# Patient Record
Sex: MALE | Race: BLACK OR AFRICAN AMERICAN | NOT HISPANIC OR LATINO | Employment: FULL TIME | RURAL
[De-identification: names, ages, dates, MRNs, and addresses within clinical notes are randomized per-mention and may not be internally consistent; named-entity substitution may affect disease eponyms.]

---

## 2021-06-22 ENCOUNTER — OFFICE VISIT (OUTPATIENT)
Dept: FAMILY MEDICINE | Facility: CLINIC | Age: 56
End: 2021-06-22
Payer: COMMERCIAL

## 2021-06-22 VITALS
HEART RATE: 59 BPM | SYSTOLIC BLOOD PRESSURE: 137 MMHG | BODY MASS INDEX: 39.71 KG/M2 | HEIGHT: 68 IN | WEIGHT: 262 LBS | RESPIRATION RATE: 18 BRPM | TEMPERATURE: 97 F | DIASTOLIC BLOOD PRESSURE: 84 MMHG

## 2021-06-22 DIAGNOSIS — E78.5 HYPERLIPIDEMIA, UNSPECIFIED HYPERLIPIDEMIA TYPE: ICD-10-CM

## 2021-06-22 DIAGNOSIS — I10 HYPERTENSION, UNSPECIFIED TYPE: Primary | ICD-10-CM

## 2021-06-22 DIAGNOSIS — M19.90 ARTHRITIS: ICD-10-CM

## 2021-06-22 DIAGNOSIS — K21.9 GASTROESOPHAGEAL REFLUX DISEASE, UNSPECIFIED WHETHER ESOPHAGITIS PRESENT: ICD-10-CM

## 2021-06-22 LAB
ALBUMIN SERPL BCP-MCNC: 4 G/DL (ref 3.5–5)
ALBUMIN/GLOB SERPL: 1.1 {RATIO}
ALP SERPL-CCNC: 62 U/L (ref 45–115)
ALT SERPL W P-5'-P-CCNC: 31 U/L (ref 16–61)
ANION GAP SERPL CALCULATED.3IONS-SCNC: 10 MMOL/L (ref 7–16)
AST SERPL W P-5'-P-CCNC: 20 U/L (ref 15–37)
BILIRUB SERPL-MCNC: 1.1 MG/DL (ref 0–1.2)
BUN SERPL-MCNC: 12 MG/DL (ref 7–18)
BUN/CREAT SERPL: 15 (ref 6–20)
CALCIUM SERPL-MCNC: 9.4 MG/DL (ref 8.5–10.1)
CHLORIDE SERPL-SCNC: 103 MMOL/L (ref 98–107)
CHOLEST SERPL-MCNC: 151 MG/DL (ref 0–200)
CHOLEST/HDLC SERPL: 2.1 {RATIO}
CO2 SERPL-SCNC: 29 MMOL/L (ref 21–32)
CREAT SERPL-MCNC: 0.79 MG/DL (ref 0.7–1.3)
CREAT UR-MCNC: 117 MG/DL (ref 39–259)
GLOBULIN SER-MCNC: 3.7 G/DL (ref 2–4)
GLUCOSE SERPL-MCNC: 104 MG/DL (ref 74–106)
HDLC SERPL-MCNC: 72 MG/DL (ref 40–60)
LDLC SERPL CALC-MCNC: 68 MG/DL
LDLC/HDLC SERPL: 0.9 {RATIO}
MICROALBUMIN UR-MCNC: 7.3 MG/DL (ref 0–2.8)
MICROALBUMIN/CREAT RATIO PNL UR: 62.4 MG/G (ref 0–30)
NONHDLC SERPL-MCNC: 79 MG/DL
POTASSIUM SERPL-SCNC: 3.4 MMOL/L (ref 3.5–5.1)
PROT SERPL-MCNC: 7.7 G/DL (ref 6.4–8.2)
SODIUM SERPL-SCNC: 139 MMOL/L (ref 136–145)
TRIGL SERPL-MCNC: 57 MG/DL (ref 35–150)
VLDLC SERPL-MCNC: 11 MG/DL

## 2021-06-22 PROCEDURE — 82570 ASSAY OF URINE CREATININE: CPT | Mod: ,,, | Performed by: CLINICAL MEDICAL LABORATORY

## 2021-06-22 PROCEDURE — 99213 OFFICE O/P EST LOW 20 MIN: CPT | Mod: ,,, | Performed by: NURSE PRACTITIONER

## 2021-06-22 PROCEDURE — 80061 LIPID PANEL: CPT | Mod: ,,, | Performed by: CLINICAL MEDICAL LABORATORY

## 2021-06-22 PROCEDURE — 80053 COMPREHENSIVE METABOLIC PANEL: ICD-10-PCS | Mod: ,,, | Performed by: CLINICAL MEDICAL LABORATORY

## 2021-06-22 PROCEDURE — 82043 UR ALBUMIN QUANTITATIVE: CPT | Mod: ,,, | Performed by: CLINICAL MEDICAL LABORATORY

## 2021-06-22 PROCEDURE — 82570 MICROALBUMIN / CREATININE RATIO URINE: ICD-10-PCS | Mod: ,,, | Performed by: CLINICAL MEDICAL LABORATORY

## 2021-06-22 PROCEDURE — 80053 COMPREHEN METABOLIC PANEL: CPT | Mod: ,,, | Performed by: CLINICAL MEDICAL LABORATORY

## 2021-06-22 PROCEDURE — 99213 PR OFFICE/OUTPT VISIT, EST, LEVL III, 20-29 MIN: ICD-10-PCS | Mod: ,,, | Performed by: NURSE PRACTITIONER

## 2021-06-22 PROCEDURE — 82043 MICROALBUMIN / CREATININE RATIO URINE: ICD-10-PCS | Mod: ,,, | Performed by: CLINICAL MEDICAL LABORATORY

## 2021-06-22 PROCEDURE — 80061 LIPID PANEL: ICD-10-PCS | Mod: ,,, | Performed by: CLINICAL MEDICAL LABORATORY

## 2021-06-22 RX ORDER — OLMESARTAN MEDOXOMIL, AMLODIPINE AND HYDROCHLOROTHIAZIDE TABLET 40/10/25 MG 40; 10; 25 MG/1; MG/1; MG/1
1 TABLET ORAL DAILY
COMMUNITY
Start: 2021-02-16 | End: 2021-06-22 | Stop reason: SDUPTHER

## 2021-06-22 RX ORDER — OMEPRAZOLE 20 MG/1
20 CAPSULE, DELAYED RELEASE ORAL DAILY
Qty: 90 CAPSULE | Refills: 0 | Status: SHIPPED | OUTPATIENT
Start: 2021-06-22 | End: 2021-10-13 | Stop reason: SDUPTHER

## 2021-06-22 RX ORDER — MELOXICAM 15 MG/1
15 TABLET ORAL DAILY PRN
Qty: 90 TABLET | Refills: 0 | Status: SHIPPED | OUTPATIENT
Start: 2021-06-22 | End: 2021-10-13 | Stop reason: SDUPTHER

## 2021-06-22 RX ORDER — OLMESARTAN MEDOXOMIL, AMLODIPINE AND HYDROCHLOROTHIAZIDE TABLET 40/10/25 MG 40; 10; 25 MG/1; MG/1; MG/1
1 TABLET ORAL DAILY
Qty: 90 TABLET | Refills: 0 | Status: SHIPPED | OUTPATIENT
Start: 2021-06-22 | End: 2021-10-13 | Stop reason: SDUPTHER

## 2021-06-22 RX ORDER — OMEPRAZOLE 20 MG/1
20 CAPSULE, DELAYED RELEASE ORAL DAILY
COMMUNITY
Start: 2021-03-22 | End: 2021-06-22 | Stop reason: SDUPTHER

## 2021-06-22 RX ORDER — MELOXICAM 15 MG/1
15 TABLET ORAL DAILY PRN
COMMUNITY
Start: 2021-03-10 | End: 2021-06-22 | Stop reason: SDUPTHER

## 2021-06-23 RX ORDER — POTASSIUM CHLORIDE 20 MEQ/1
20 TABLET, EXTENDED RELEASE ORAL DAILY
Qty: 90 TABLET | Refills: 0 | Status: SHIPPED | OUTPATIENT
Start: 2021-06-23 | End: 2021-09-14

## 2021-10-13 ENCOUNTER — OFFICE VISIT (OUTPATIENT)
Dept: FAMILY MEDICINE | Facility: CLINIC | Age: 56
End: 2021-10-13
Payer: COMMERCIAL

## 2021-10-13 VITALS
WEIGHT: 260 LBS | TEMPERATURE: 98 F | SYSTOLIC BLOOD PRESSURE: 153 MMHG | BODY MASS INDEX: 39.4 KG/M2 | HEART RATE: 68 BPM | RESPIRATION RATE: 18 BRPM | DIASTOLIC BLOOD PRESSURE: 85 MMHG | HEIGHT: 68 IN

## 2021-10-13 DIAGNOSIS — E78.5 HYPERLIPIDEMIA, UNSPECIFIED HYPERLIPIDEMIA TYPE: ICD-10-CM

## 2021-10-13 DIAGNOSIS — K21.9 GASTROESOPHAGEAL REFLUX DISEASE, UNSPECIFIED WHETHER ESOPHAGITIS PRESENT: ICD-10-CM

## 2021-10-13 DIAGNOSIS — I10 HYPERTENSION, UNSPECIFIED TYPE: Primary | ICD-10-CM

## 2021-10-13 DIAGNOSIS — M19.90 ARTHRITIS: ICD-10-CM

## 2021-10-13 LAB
ALBUMIN SERPL BCP-MCNC: 4 G/DL (ref 3.5–5)
ALBUMIN/GLOB SERPL: 1.1 {RATIO}
ALP SERPL-CCNC: 62 U/L (ref 45–115)
ALT SERPL W P-5'-P-CCNC: 33 U/L (ref 16–61)
ANION GAP SERPL CALCULATED.3IONS-SCNC: 9 MMOL/L (ref 7–16)
AST SERPL W P-5'-P-CCNC: 19 U/L (ref 15–37)
BILIRUB SERPL-MCNC: 0.8 MG/DL (ref 0–1.2)
BUN SERPL-MCNC: 12 MG/DL (ref 7–18)
BUN/CREAT SERPL: 14 (ref 6–20)
CALCIUM SERPL-MCNC: 9.3 MG/DL (ref 8.5–10.1)
CHLORIDE SERPL-SCNC: 106 MMOL/L (ref 98–107)
CHOLEST SERPL-MCNC: 156 MG/DL (ref 0–200)
CHOLEST/HDLC SERPL: 2.3 {RATIO}
CO2 SERPL-SCNC: 27 MMOL/L (ref 21–32)
CREAT SERPL-MCNC: 0.86 MG/DL (ref 0.7–1.3)
CREAT UR-MCNC: 37 MG/DL (ref 39–259)
GLOBULIN SER-MCNC: 3.8 G/DL (ref 2–4)
GLUCOSE SERPL-MCNC: 115 MG/DL (ref 74–106)
HDLC SERPL-MCNC: 69 MG/DL (ref 40–60)
LDLC SERPL CALC-MCNC: 78 MG/DL
LDLC/HDLC SERPL: 1.1 {RATIO}
MICROALBUMIN UR-MCNC: 10.3 MG/DL (ref 0–2.8)
MICROALBUMIN/CREAT RATIO PNL UR: 278.4 MG/G (ref 0–30)
NONHDLC SERPL-MCNC: 87 MG/DL
POTASSIUM SERPL-SCNC: 3.6 MMOL/L (ref 3.5–5.1)
PROT SERPL-MCNC: 7.8 G/DL (ref 6.4–8.2)
SODIUM SERPL-SCNC: 138 MMOL/L (ref 136–145)
TRIGL SERPL-MCNC: 45 MG/DL (ref 35–150)
VLDLC SERPL-MCNC: 9 MG/DL

## 2021-10-13 PROCEDURE — 82570 ASSAY OF URINE CREATININE: CPT | Mod: ,,, | Performed by: CLINICAL MEDICAL LABORATORY

## 2021-10-13 PROCEDURE — 80061 LIPID PANEL: CPT | Mod: ,,, | Performed by: CLINICAL MEDICAL LABORATORY

## 2021-10-13 PROCEDURE — 80053 COMPREHENSIVE METABOLIC PANEL: ICD-10-PCS | Mod: ,,, | Performed by: CLINICAL MEDICAL LABORATORY

## 2021-10-13 PROCEDURE — 82043 UR ALBUMIN QUANTITATIVE: CPT | Mod: ,,, | Performed by: CLINICAL MEDICAL LABORATORY

## 2021-10-13 PROCEDURE — 82043 MICROALBUMIN / CREATININE RATIO URINE: ICD-10-PCS | Mod: ,,, | Performed by: CLINICAL MEDICAL LABORATORY

## 2021-10-13 PROCEDURE — 80061 LIPID PANEL: ICD-10-PCS | Mod: ,,, | Performed by: CLINICAL MEDICAL LABORATORY

## 2021-10-13 PROCEDURE — 82570 MICROALBUMIN / CREATININE RATIO URINE: ICD-10-PCS | Mod: ,,, | Performed by: CLINICAL MEDICAL LABORATORY

## 2021-10-13 PROCEDURE — 99213 OFFICE O/P EST LOW 20 MIN: CPT | Mod: ,,, | Performed by: NURSE PRACTITIONER

## 2021-10-13 PROCEDURE — 80053 COMPREHEN METABOLIC PANEL: CPT | Mod: ,,, | Performed by: CLINICAL MEDICAL LABORATORY

## 2021-10-13 PROCEDURE — 99213 PR OFFICE/OUTPT VISIT, EST, LEVL III, 20-29 MIN: ICD-10-PCS | Mod: ,,, | Performed by: NURSE PRACTITIONER

## 2021-10-13 RX ORDER — MELOXICAM 15 MG/1
15 TABLET ORAL DAILY PRN
Qty: 90 TABLET | Refills: 1 | Status: SHIPPED | OUTPATIENT
Start: 2021-10-13 | End: 2022-05-17

## 2021-10-13 RX ORDER — POTASSIUM CHLORIDE 20 MEQ/1
20 TABLET, EXTENDED RELEASE ORAL DAILY
Qty: 90 TABLET | Refills: 1 | Status: SHIPPED | OUTPATIENT
Start: 2021-10-13 | End: 2022-06-29 | Stop reason: SDUPTHER

## 2021-10-13 RX ORDER — OLMESARTAN MEDOXOMIL, AMLODIPINE AND HYDROCHLOROTHIAZIDE TABLET 40/10/25 MG 40; 10; 25 MG/1; MG/1; MG/1
1 TABLET ORAL DAILY
Qty: 90 TABLET | Refills: 1 | Status: SHIPPED | OUTPATIENT
Start: 2021-10-13 | End: 2022-04-18

## 2021-10-13 RX ORDER — OMEPRAZOLE 20 MG/1
20 CAPSULE, DELAYED RELEASE ORAL DAILY
Qty: 90 CAPSULE | Refills: 1 | Status: SHIPPED | OUTPATIENT
Start: 2021-10-13 | End: 2022-04-18

## 2022-02-03 ENCOUNTER — PATIENT OUTREACH (OUTPATIENT)
Dept: ADMINISTRATIVE | Facility: HOSPITAL | Age: 57
End: 2022-02-03

## 2022-06-29 ENCOUNTER — OFFICE VISIT (OUTPATIENT)
Dept: FAMILY MEDICINE | Facility: CLINIC | Age: 57
End: 2022-06-29
Payer: COMMERCIAL

## 2022-06-29 VITALS
SYSTOLIC BLOOD PRESSURE: 132 MMHG | WEIGHT: 270 LBS | TEMPERATURE: 98 F | DIASTOLIC BLOOD PRESSURE: 88 MMHG | HEIGHT: 68 IN | HEART RATE: 59 BPM | RESPIRATION RATE: 18 BRPM | BODY MASS INDEX: 40.92 KG/M2

## 2022-06-29 DIAGNOSIS — E55.9 VITAMIN D DEFICIENCY: ICD-10-CM

## 2022-06-29 DIAGNOSIS — E78.5 HYPERLIPIDEMIA, UNSPECIFIED HYPERLIPIDEMIA TYPE: ICD-10-CM

## 2022-06-29 DIAGNOSIS — M19.90 ARTHRITIS: ICD-10-CM

## 2022-06-29 DIAGNOSIS — R73.01 IMPAIRED FASTING GLUCOSE: ICD-10-CM

## 2022-06-29 DIAGNOSIS — R06.83 SNORING: ICD-10-CM

## 2022-06-29 DIAGNOSIS — R53.83 FATIGUE, UNSPECIFIED TYPE: ICD-10-CM

## 2022-06-29 DIAGNOSIS — Z12.5 SCREENING FOR PROSTATE CANCER: ICD-10-CM

## 2022-06-29 DIAGNOSIS — K21.9 GASTROESOPHAGEAL REFLUX DISEASE, UNSPECIFIED WHETHER ESOPHAGITIS PRESENT: ICD-10-CM

## 2022-06-29 DIAGNOSIS — I10 HYPERTENSION, UNSPECIFIED TYPE: Primary | ICD-10-CM

## 2022-06-29 DIAGNOSIS — E53.8 VITAMIN B12 DEFICIENCY: ICD-10-CM

## 2022-06-29 LAB
25(OH)D3 SERPL-MCNC: 20.3 NG/ML
ALBUMIN SERPL BCP-MCNC: 3.8 G/DL (ref 3.5–5)
ALBUMIN/GLOB SERPL: 1.1 {RATIO}
ALP SERPL-CCNC: 64 U/L (ref 45–115)
ALT SERPL W P-5'-P-CCNC: 33 U/L (ref 16–61)
ANION GAP SERPL CALCULATED.3IONS-SCNC: 10 MMOL/L (ref 7–16)
AST SERPL W P-5'-P-CCNC: 17 U/L (ref 15–37)
BASOPHILS # BLD AUTO: 0.05 K/UL (ref 0–0.2)
BASOPHILS NFR BLD AUTO: 0.8 % (ref 0–1)
BILIRUB SERPL-MCNC: 1 MG/DL (ref 0–1.2)
BUN SERPL-MCNC: 16 MG/DL (ref 7–18)
BUN/CREAT SERPL: 18 (ref 6–20)
CALCIUM SERPL-MCNC: 9.4 MG/DL (ref 8.5–10.1)
CHLORIDE SERPL-SCNC: 104 MMOL/L (ref 98–107)
CHOLEST SERPL-MCNC: 163 MG/DL (ref 0–200)
CHOLEST/HDLC SERPL: 2.5 {RATIO}
CO2 SERPL-SCNC: 29 MMOL/L (ref 21–32)
CREAT SERPL-MCNC: 0.88 MG/DL (ref 0.7–1.3)
CREAT UR-MCNC: 40 MG/DL (ref 39–259)
DIFFERENTIAL METHOD BLD: ABNORMAL
EOSINOPHIL # BLD AUTO: 0.2 K/UL (ref 0–0.5)
EOSINOPHIL NFR BLD AUTO: 3.3 % (ref 1–4)
ERYTHROCYTE [DISTWIDTH] IN BLOOD BY AUTOMATED COUNT: 14.3 % (ref 11.5–14.5)
EST. AVERAGE GLUCOSE BLD GHB EST-MCNC: 84 MG/DL
FOLATE SERPL-MCNC: 13.1 NG/ML (ref 3.1–17.5)
GLOBULIN SER-MCNC: 3.4 G/DL (ref 2–4)
GLUCOSE SERPL-MCNC: 113 MG/DL (ref 74–106)
HBA1C MFR BLD HPLC: 5.1 % (ref 4.5–6.6)
HCT VFR BLD AUTO: 38.6 % (ref 40–54)
HDLC SERPL-MCNC: 65 MG/DL (ref 40–60)
HGB BLD-MCNC: 12.5 G/DL (ref 13.5–18)
IMM GRANULOCYTES # BLD AUTO: 0.01 K/UL (ref 0–0.04)
IMM GRANULOCYTES NFR BLD: 0.2 % (ref 0–0.4)
LDLC SERPL CALC-MCNC: 85 MG/DL
LDLC/HDLC SERPL: 1.3 {RATIO}
LYMPHOCYTES # BLD AUTO: 1.81 K/UL (ref 1–4.8)
LYMPHOCYTES NFR BLD AUTO: 29.8 % (ref 27–41)
MCH RBC QN AUTO: 28.5 PG (ref 27–31)
MCHC RBC AUTO-ENTMCNC: 32.4 G/DL (ref 32–36)
MCV RBC AUTO: 87.9 FL (ref 80–96)
MICROALBUMIN UR-MCNC: 6 MG/DL (ref 0–2.8)
MICROALBUMIN/CREAT RATIO PNL UR: 150 MG/G (ref 0–30)
MONOCYTES # BLD AUTO: 0.37 K/UL (ref 0–0.8)
MONOCYTES NFR BLD AUTO: 6.1 % (ref 2–6)
MPC BLD CALC-MCNC: 11.6 FL (ref 9.4–12.4)
NEUTROPHILS # BLD AUTO: 3.64 K/UL (ref 1.8–7.7)
NEUTROPHILS NFR BLD AUTO: 59.8 % (ref 53–65)
NONHDLC SERPL-MCNC: 98 MG/DL
NRBC # BLD AUTO: 0 X10E3/UL
NRBC, AUTO (.00): 0 %
PLATELET # BLD AUTO: 243 K/UL (ref 150–400)
POTASSIUM SERPL-SCNC: 3.8 MMOL/L (ref 3.5–5.1)
PROT SERPL-MCNC: 7.2 G/DL (ref 6.4–8.2)
PSA SERPL-MCNC: 0.39 NG/ML (ref 0–3.1)
RBC # BLD AUTO: 4.39 M/UL (ref 4.6–6.2)
SODIUM SERPL-SCNC: 139 MMOL/L (ref 136–145)
TRIGL SERPL-MCNC: 66 MG/DL (ref 35–150)
TSH SERPL DL<=0.005 MIU/L-ACNC: 1.52 UIU/ML (ref 0.36–3.74)
VIT B12 SERPL-MCNC: 442 PG/ML (ref 193–986)
VLDLC SERPL-MCNC: 13 MG/DL
WBC # BLD AUTO: 6.08 K/UL (ref 4.5–11)

## 2022-06-29 PROCEDURE — 1159F MED LIST DOCD IN RCRD: CPT | Mod: CPTII,,, | Performed by: NURSE PRACTITIONER

## 2022-06-29 PROCEDURE — 99214 OFFICE O/P EST MOD 30 MIN: CPT | Mod: ,,, | Performed by: NURSE PRACTITIONER

## 2022-06-29 PROCEDURE — 84443 TSH: ICD-10-PCS | Mod: ,,, | Performed by: CLINICAL MEDICAL LABORATORY

## 2022-06-29 PROCEDURE — 80053 COMPREHEN METABOLIC PANEL: CPT | Mod: ,,, | Performed by: CLINICAL MEDICAL LABORATORY

## 2022-06-29 PROCEDURE — 3075F PR MOST RECENT SYSTOLIC BLOOD PRESS GE 130-139MM HG: ICD-10-PCS | Mod: CPTII,,, | Performed by: NURSE PRACTITIONER

## 2022-06-29 PROCEDURE — 1159F PR MEDICATION LIST DOCUMENTED IN MEDICAL RECORD: ICD-10-PCS | Mod: CPTII,,, | Performed by: NURSE PRACTITIONER

## 2022-06-29 PROCEDURE — 3079F PR MOST RECENT DIASTOLIC BLOOD PRESSURE 80-89 MM HG: ICD-10-PCS | Mod: CPTII,,, | Performed by: NURSE PRACTITIONER

## 2022-06-29 PROCEDURE — 82570 ASSAY OF URINE CREATININE: CPT | Mod: ,,, | Performed by: CLINICAL MEDICAL LABORATORY

## 2022-06-29 PROCEDURE — G0103 PSA SCREENING: HCPCS | Mod: ,,, | Performed by: CLINICAL MEDICAL LABORATORY

## 2022-06-29 PROCEDURE — 1160F RVW MEDS BY RX/DR IN RCRD: CPT | Mod: CPTII,,, | Performed by: NURSE PRACTITIONER

## 2022-06-29 PROCEDURE — 85025 COMPLETE CBC W/AUTO DIFF WBC: CPT | Mod: ,,, | Performed by: CLINICAL MEDICAL LABORATORY

## 2022-06-29 PROCEDURE — 82607 VITAMIN B12/FOLATE, SERUM PANEL: ICD-10-PCS | Mod: ,,, | Performed by: CLINICAL MEDICAL LABORATORY

## 2022-06-29 PROCEDURE — 82043 UR ALBUMIN QUANTITATIVE: CPT | Mod: ,,, | Performed by: CLINICAL MEDICAL LABORATORY

## 2022-06-29 PROCEDURE — 3075F SYST BP GE 130 - 139MM HG: CPT | Mod: CPTII,,, | Performed by: NURSE PRACTITIONER

## 2022-06-29 PROCEDURE — 80061 LIPID PANEL: CPT | Mod: ,,, | Performed by: CLINICAL MEDICAL LABORATORY

## 2022-06-29 PROCEDURE — 82746 VITAMIN B12/FOLATE, SERUM PANEL: ICD-10-PCS | Mod: ,,, | Performed by: CLINICAL MEDICAL LABORATORY

## 2022-06-29 PROCEDURE — 82306 VITAMIN D 25 HYDROXY: CPT | Mod: ,,, | Performed by: CLINICAL MEDICAL LABORATORY

## 2022-06-29 PROCEDURE — 83036 HEMOGLOBIN A1C: ICD-10-PCS | Mod: ,,, | Performed by: CLINICAL MEDICAL LABORATORY

## 2022-06-29 PROCEDURE — 3008F PR BODY MASS INDEX (BMI) DOCUMENTED: ICD-10-PCS | Mod: CPTII,,, | Performed by: NURSE PRACTITIONER

## 2022-06-29 PROCEDURE — 80053 COMPREHENSIVE METABOLIC PANEL: ICD-10-PCS | Mod: ,,, | Performed by: CLINICAL MEDICAL LABORATORY

## 2022-06-29 PROCEDURE — 99214 PR OFFICE/OUTPT VISIT, EST, LEVL IV, 30-39 MIN: ICD-10-PCS | Mod: ,,, | Performed by: NURSE PRACTITIONER

## 2022-06-29 PROCEDURE — 82746 ASSAY OF FOLIC ACID SERUM: CPT | Mod: ,,, | Performed by: CLINICAL MEDICAL LABORATORY

## 2022-06-29 PROCEDURE — 84443 ASSAY THYROID STIM HORMONE: CPT | Mod: ,,, | Performed by: CLINICAL MEDICAL LABORATORY

## 2022-06-29 PROCEDURE — 82607 VITAMIN B-12: CPT | Mod: ,,, | Performed by: CLINICAL MEDICAL LABORATORY

## 2022-06-29 PROCEDURE — 3079F DIAST BP 80-89 MM HG: CPT | Mod: CPTII,,, | Performed by: NURSE PRACTITIONER

## 2022-06-29 PROCEDURE — 82043 MICROALBUMIN / CREATININE RATIO URINE: ICD-10-PCS | Mod: ,,, | Performed by: CLINICAL MEDICAL LABORATORY

## 2022-06-29 PROCEDURE — 82306 VITAMIN D: ICD-10-PCS | Mod: ,,, | Performed by: CLINICAL MEDICAL LABORATORY

## 2022-06-29 PROCEDURE — 80061 LIPID PANEL: ICD-10-PCS | Mod: ,,, | Performed by: CLINICAL MEDICAL LABORATORY

## 2022-06-29 PROCEDURE — 1160F PR REVIEW ALL MEDS BY PRESCRIBER/CLIN PHARMACIST DOCUMENTED: ICD-10-PCS | Mod: CPTII,,, | Performed by: NURSE PRACTITIONER

## 2022-06-29 PROCEDURE — 3008F BODY MASS INDEX DOCD: CPT | Mod: CPTII,,, | Performed by: NURSE PRACTITIONER

## 2022-06-29 PROCEDURE — G0103 PSA, SCREENING: ICD-10-PCS | Mod: ,,, | Performed by: CLINICAL MEDICAL LABORATORY

## 2022-06-29 PROCEDURE — 83036 HEMOGLOBIN GLYCOSYLATED A1C: CPT | Mod: ,,, | Performed by: CLINICAL MEDICAL LABORATORY

## 2022-06-29 PROCEDURE — 82570 MICROALBUMIN / CREATININE RATIO URINE: ICD-10-PCS | Mod: ,,, | Performed by: CLINICAL MEDICAL LABORATORY

## 2022-06-29 PROCEDURE — 85025 CBC WITH DIFFERENTIAL: ICD-10-PCS | Mod: ,,, | Performed by: CLINICAL MEDICAL LABORATORY

## 2022-06-29 RX ORDER — POTASSIUM CHLORIDE 20 MEQ/1
20 TABLET, EXTENDED RELEASE ORAL DAILY
Qty: 90 TABLET | Refills: 1 | Status: SHIPPED | OUTPATIENT
Start: 2022-06-29 | End: 2022-12-19 | Stop reason: SDUPTHER

## 2022-06-29 RX ORDER — MELOXICAM 15 MG/1
15 TABLET ORAL DAILY PRN
Qty: 90 TABLET | Refills: 1 | Status: SHIPPED | OUTPATIENT
Start: 2022-06-29 | End: 2022-12-19 | Stop reason: SDUPTHER

## 2022-06-29 RX ORDER — OLMESARTAN MEDOXOMIL, AMLODIPINE AND HYDROCHLOROTHIAZIDE TABLET 40/10/25 MG 40; 10; 25 MG/1; MG/1; MG/1
1 TABLET ORAL DAILY
Qty: 90 TABLET | Refills: 1 | Status: SHIPPED | OUTPATIENT
Start: 2022-06-29 | End: 2022-12-19 | Stop reason: SDUPTHER

## 2022-06-29 RX ORDER — OMEPRAZOLE 20 MG/1
20 CAPSULE, DELAYED RELEASE ORAL DAILY
Qty: 90 CAPSULE | Refills: 1 | Status: SHIPPED | OUTPATIENT
Start: 2022-06-29 | End: 2022-12-19 | Stop reason: SDUPTHER

## 2022-06-29 NOTE — PROGRESS NOTES
AMAURY Carroll   [address] [place] [address]     PATIENT NAME: Kingsley Bridges  : 1965  DATE: 22  MRN: [id number]      Billing Provider: AMAURY Carroll  Level of Service: [place] OFFICE/OUTPT VISIT, EST, LEVL IV, 30-39 MIN  Patient PCP Information     Provider PCP Type    AMAURY Carroll General          Reason for Visit / Chief Complaint: Hypertension       Update PCP  Update Chief Complaint         History of Present Illness / Problem Focused Workflow     Kingsley Bridges presents to the clinic with Hypertension     HPI    Review of Systems     Review of Systems   Constitutional: Negative for chills, diaphoresis, fatigue and fever.   HENT: Negative for nasal congestion, dental problem, ear pain and postnasal drip.    Eyes: Negative for visual disturbance.   Respiratory: Negative for shortness of breath and wheezing.    Cardiovascular: Negative for chest pain and palpitations.   Gastrointestinal: Negative for abdominal distention and abdominal pain.   Endocrine: Negative for cold intolerance and heat intolerance.   Genitourinary: Negative for enuresis.   Musculoskeletal: Negative for neck stiffness.   Integumentary:  Negative for pallor and rash.   Neurological: Negative for dizziness, seizures, speech difficulty and weakness.   Psychiatric/Behavioral: Negative for agitation.        Medical / Social / Family History     Past Medical History:   Diagnosis Date    Acid reflux     Hypertension        Past Surgical History:   Procedure Laterality Date    KNEE SURGERY Left     KNEE SURGERY Right        Social History    reports that he has never smoked. He has never used smokeless tobacco. He reports current alcohol use. He reports that he does not use drugs.    Family History  's family history includes Diabetes in his father; Heart disease in his brother; No Known Problems in his mother.    No flowsheet data found.        Medications and Allergies  "    Medications  Outpatient Medications Marked as Taking for the 6/29/22 encounter (Office Visit) with AMAURY Carroll   Medication Sig Dispense Refill    [DISCONTINUED] meloxicam (MOBIC) 15 MG tablet TAKE 1 TABLET BY MOUTH EVERY DAY AS NEEDED FOR PAIN 30 tablet 5    [DISCONTINUED] olmesartan-amLODIPin-hcthiazid 40-10-25 mg Tab TAKE 1 TABLET BY MOUTH EVERY DAY 90 tablet 1    [DISCONTINUED] omeprazole (PRILOSEC) 20 MG capsule TAKE 1 CAPSULE BY MOUTH EVERY DAY 90 capsule 1    [DISCONTINUED] potassium chloride SA (KLOR-CON M20) 20 MEQ tablet Take 1 tablet (20 mEq total) by mouth once daily. 90 tablet 1       Allergies  Review of patient's allergies indicates:  No Known Allergies    Physical Examination   /88 (BP Location: Left arm, Patient Position: Sitting, BP Method: Large (Automatic))   Pulse (!) 59   Temp 98.3 °F (36.8 °C) (Oral)   Resp 18   Ht 5' 8" (1.727 m)   Wt 122.5 kg (270 lb)   BMI 41.05 kg/m²   Physical Exam  Vitals and nursing note reviewed.   Constitutional:       Appearance: Normal appearance.   HENT:      Head: Normocephalic and atraumatic.      Right Ear: External ear normal.      Left Ear: External ear normal.      Nose: Nose normal.      Mouth/Throat:      Mouth: Mucous membranes are moist.      Pharynx: Oropharynx is clear.   Eyes:      Pupils: Pupils are equal, round, and reactive to light.   Cardiovascular:      Rate and Rhythm: Normal rate and regular rhythm.      Pulses: Normal pulses.      Heart sounds: Normal heart sounds. No murmur heard.    No gallop.   Pulmonary:      Effort: Pulmonary effort is normal.      Breath sounds: Normal breath sounds. No wheezing or rales.   Abdominal:      General: Abdomen is flat. Bowel sounds are normal. There is no distension.      Palpations: Abdomen is soft.      Tenderness: There is no abdominal tenderness.   Musculoskeletal:         General: Normal range of motion.      Cervical back: Normal range of motion and neck supple.   Skin:     " General: Skin is warm and dry.      Capillary Refill: Capillary refill takes less than 2 seconds.   Neurological:      General: No focal deficit present.      Mental Status: He is alert and oriented to person, place, and time.   Psychiatric:         Mood and Affect: Mood normal.         Behavior: Behavior normal.          Assessment and Plan (including Health Maintenance)      Problem List  Smart Sets  Document Outside HM   :    Plan:         Health Maintenance Due   Topic Date Due    Hepatitis C Screening  Never done    COVID-19 Vaccine (1) Never done    HIV Screening  Never done    TETANUS VACCINE  Never done    Shingles Vaccine (1 of 2) Never done       Problem List Items Addressed This Visit        Cardiac/Vascular    Hypertension - Primary    Relevant Medications    potassium chloride SA (KLOR-CON M20) 20 MEQ tablet    olmesartan-amLODIPin-hcthiazid 40-10-25 mg Tab    Other Relevant Orders    Comprehensive Metabolic Panel    Hyperlipidemia    Relevant Orders    Lipid Panel       Renal/    Screening for prostate cancer    Relevant Orders    PSA, Screening       Endocrine    Vitamin D deficiency    Relevant Orders    Vitamin D    Vitamin B12 deficiency    Relevant Orders    Vitamin B12 & Folate    Impaired fasting glucose    Relevant Orders    Hemoglobin A1C    Microalbumin/Creatinine Ratio, Urine    BMI 40.0-44.9, adult       GI    Gastroesophageal reflux disease    Relevant Medications    omeprazole (PRILOSEC) 20 MG capsule       Orthopedic    Arthritis    Relevant Medications    meloxicam (MOBIC) 15 MG tablet       Other    Fatigue    Relevant Orders    CBC Auto Differential    TSH      Other Visit Diagnoses     Snoring        Relevant Orders    Ambulatory referral/consult to Sleep Disorders          Health Maintenance Topics with due status: Not Due       Topic Last Completion Date    Colorectal Cancer Screening 11/05/2015    Lipid Panel 10/13/2021    Influenza Vaccine Not Due       No future  appointments.     Follow up in about 3 months (around 9/29/2022), or if symptoms worsen or fail to improve.        Signature:  AMAURY Carroll      [address] [place] [address]    Date of encounter: 6/29/22

## 2022-12-20 ENCOUNTER — OFFICE VISIT (OUTPATIENT)
Dept: FAMILY MEDICINE | Facility: CLINIC | Age: 57
End: 2022-12-20
Payer: COMMERCIAL

## 2022-12-20 VITALS
HEART RATE: 83 BPM | HEIGHT: 68 IN | RESPIRATION RATE: 18 BRPM | DIASTOLIC BLOOD PRESSURE: 84 MMHG | TEMPERATURE: 98 F | WEIGHT: 282 LBS | BODY MASS INDEX: 42.74 KG/M2 | SYSTOLIC BLOOD PRESSURE: 132 MMHG | OXYGEN SATURATION: 95 %

## 2022-12-20 DIAGNOSIS — M25.532 LEFT WRIST PAIN: ICD-10-CM

## 2022-12-20 DIAGNOSIS — M19.90 ARTHRITIS: ICD-10-CM

## 2022-12-20 DIAGNOSIS — Z11.59 SCREENING FOR VIRAL DISEASE: ICD-10-CM

## 2022-12-20 DIAGNOSIS — E78.5 HYPERLIPIDEMIA, UNSPECIFIED HYPERLIPIDEMIA TYPE: ICD-10-CM

## 2022-12-20 DIAGNOSIS — I10 HYPERTENSION, UNSPECIFIED TYPE: Primary | ICD-10-CM

## 2022-12-20 DIAGNOSIS — K21.9 GASTROESOPHAGEAL REFLUX DISEASE, UNSPECIFIED WHETHER ESOPHAGITIS PRESENT: ICD-10-CM

## 2022-12-20 LAB
ALBUMIN SERPL BCP-MCNC: 4 G/DL (ref 3.5–5)
ALBUMIN/GLOB SERPL: 1.3 {RATIO}
ALP SERPL-CCNC: 71 U/L (ref 45–115)
ALT SERPL W P-5'-P-CCNC: 39 U/L (ref 16–61)
ANION GAP SERPL CALCULATED.3IONS-SCNC: 10 MMOL/L (ref 7–16)
AST SERPL W P-5'-P-CCNC: 24 U/L (ref 15–37)
BASOPHILS # BLD AUTO: 0.05 K/UL (ref 0–0.2)
BASOPHILS NFR BLD AUTO: 0.9 % (ref 0–1)
BILIRUB SERPL-MCNC: 0.7 MG/DL (ref ?–1.2)
BUN SERPL-MCNC: 12 MG/DL (ref 7–18)
BUN/CREAT SERPL: 14 (ref 6–20)
CALCIUM SERPL-MCNC: 8.9 MG/DL (ref 8.5–10.1)
CHLORIDE SERPL-SCNC: 104 MMOL/L (ref 98–107)
CHOLEST SERPL-MCNC: 177 MG/DL (ref 0–200)
CHOLEST/HDLC SERPL: 2.8 {RATIO}
CO2 SERPL-SCNC: 30 MMOL/L (ref 21–32)
CREAT SERPL-MCNC: 0.86 MG/DL (ref 0.7–1.3)
DIFFERENTIAL METHOD BLD: ABNORMAL
EGFR (NO RACE VARIABLE) (RUSH/TITUS): 100 ML/MIN/1.73M²
EOSINOPHIL # BLD AUTO: 0.15 K/UL (ref 0–0.5)
EOSINOPHIL NFR BLD AUTO: 2.6 % (ref 1–4)
ERYTHROCYTE [DISTWIDTH] IN BLOOD BY AUTOMATED COUNT: 14.6 % (ref 11.5–14.5)
GLOBULIN SER-MCNC: 3.2 G/DL (ref 2–4)
GLUCOSE SERPL-MCNC: 128 MG/DL (ref 74–106)
HCT VFR BLD AUTO: 40.5 % (ref 40–54)
HCV AB SER QL: NORMAL
HDLC SERPL-MCNC: 64 MG/DL (ref 40–60)
HGB BLD-MCNC: 12.9 G/DL (ref 13.5–18)
HIV 1+O+2 AB SERPL QL: NORMAL
IMM GRANULOCYTES # BLD AUTO: 0.01 K/UL (ref 0–0.04)
IMM GRANULOCYTES NFR BLD: 0.2 % (ref 0–0.4)
LDLC SERPL CALC-MCNC: 100 MG/DL
LDLC/HDLC SERPL: 1.6 {RATIO}
LYMPHOCYTES # BLD AUTO: 1.71 K/UL (ref 1–4.8)
LYMPHOCYTES NFR BLD AUTO: 30.2 % (ref 27–41)
MCH RBC QN AUTO: 27.9 PG (ref 27–31)
MCHC RBC AUTO-ENTMCNC: 31.9 G/DL (ref 32–36)
MCV RBC AUTO: 87.7 FL (ref 80–96)
MONOCYTES # BLD AUTO: 0.43 K/UL (ref 0–0.8)
MONOCYTES NFR BLD AUTO: 7.6 % (ref 2–6)
MPC BLD CALC-MCNC: 11.5 FL (ref 9.4–12.4)
NEUTROPHILS # BLD AUTO: 3.32 K/UL (ref 1.8–7.7)
NEUTROPHILS NFR BLD AUTO: 58.5 % (ref 53–65)
NONHDLC SERPL-MCNC: 113 MG/DL
NRBC # BLD AUTO: 0 X10E3/UL
NRBC, AUTO (.00): 0 %
PLATELET # BLD AUTO: 221 K/UL (ref 150–400)
POTASSIUM SERPL-SCNC: 3.8 MMOL/L (ref 3.5–5.1)
PROT SERPL-MCNC: 7.2 G/DL (ref 6.4–8.2)
RBC # BLD AUTO: 4.62 M/UL (ref 4.6–6.2)
SODIUM SERPL-SCNC: 140 MMOL/L (ref 136–145)
TRIGL SERPL-MCNC: 66 MG/DL (ref 35–150)
VLDLC SERPL-MCNC: 13 MG/DL
WBC # BLD AUTO: 5.67 K/UL (ref 4.5–11)

## 2022-12-20 PROCEDURE — 80053 COMPREHENSIVE METABOLIC PANEL: ICD-10-PCS | Mod: ,,, | Performed by: CLINICAL MEDICAL LABORATORY

## 2022-12-20 PROCEDURE — 99214 PR OFFICE/OUTPT VISIT, EST, LEVL IV, 30-39 MIN: ICD-10-PCS | Mod: ,,, | Performed by: NURSE PRACTITIONER

## 2022-12-20 PROCEDURE — 1160F RVW MEDS BY RX/DR IN RCRD: CPT | Mod: CPTII,,, | Performed by: NURSE PRACTITIONER

## 2022-12-20 PROCEDURE — 85025 COMPLETE CBC W/AUTO DIFF WBC: CPT | Mod: ,,, | Performed by: CLINICAL MEDICAL LABORATORY

## 2022-12-20 PROCEDURE — 3079F DIAST BP 80-89 MM HG: CPT | Mod: CPTII,,, | Performed by: NURSE PRACTITIONER

## 2022-12-20 PROCEDURE — 3060F PR POS MICROALBUMINURIA RESULT DOCUMENTED/REVIEW: ICD-10-PCS | Mod: CPTII,,, | Performed by: NURSE PRACTITIONER

## 2022-12-20 PROCEDURE — 3075F PR MOST RECENT SYSTOLIC BLOOD PRESS GE 130-139MM HG: ICD-10-PCS | Mod: CPTII,,, | Performed by: NURSE PRACTITIONER

## 2022-12-20 PROCEDURE — 3008F PR BODY MASS INDEX (BMI) DOCUMENTED: ICD-10-PCS | Mod: CPTII,,, | Performed by: NURSE PRACTITIONER

## 2022-12-20 PROCEDURE — 3079F PR MOST RECENT DIASTOLIC BLOOD PRESSURE 80-89 MM HG: ICD-10-PCS | Mod: CPTII,,, | Performed by: NURSE PRACTITIONER

## 2022-12-20 PROCEDURE — 1159F MED LIST DOCD IN RCRD: CPT | Mod: CPTII,,, | Performed by: NURSE PRACTITIONER

## 2022-12-20 PROCEDURE — 3008F BODY MASS INDEX DOCD: CPT | Mod: CPTII,,, | Performed by: NURSE PRACTITIONER

## 2022-12-20 PROCEDURE — 99214 OFFICE O/P EST MOD 30 MIN: CPT | Mod: ,,, | Performed by: NURSE PRACTITIONER

## 2022-12-20 PROCEDURE — 3066F PR DOCUMENTATION OF TREATMENT FOR NEPHROPATHY: ICD-10-PCS | Mod: CPTII,,, | Performed by: NURSE PRACTITIONER

## 2022-12-20 PROCEDURE — 3044F PR MOST RECENT HEMOGLOBIN A1C LEVEL <7.0%: ICD-10-PCS | Mod: CPTII,,, | Performed by: NURSE PRACTITIONER

## 2022-12-20 PROCEDURE — 85025 CBC WITH DIFFERENTIAL: ICD-10-PCS | Mod: ,,, | Performed by: CLINICAL MEDICAL LABORATORY

## 2022-12-20 PROCEDURE — 86803 HEPATITIS C AB TEST: CPT | Mod: ,,, | Performed by: CLINICAL MEDICAL LABORATORY

## 2022-12-20 PROCEDURE — 1159F PR MEDICATION LIST DOCUMENTED IN MEDICAL RECORD: ICD-10-PCS | Mod: CPTII,,, | Performed by: NURSE PRACTITIONER

## 2022-12-20 PROCEDURE — 3044F HG A1C LEVEL LT 7.0%: CPT | Mod: CPTII,,, | Performed by: NURSE PRACTITIONER

## 2022-12-20 PROCEDURE — 87389 HIV-1 AG W/HIV-1&-2 AB AG IA: CPT | Mod: ,,, | Performed by: CLINICAL MEDICAL LABORATORY

## 2022-12-20 PROCEDURE — 3075F SYST BP GE 130 - 139MM HG: CPT | Mod: CPTII,,, | Performed by: NURSE PRACTITIONER

## 2022-12-20 PROCEDURE — 3060F POS MICROALBUMINURIA REV: CPT | Mod: CPTII,,, | Performed by: NURSE PRACTITIONER

## 2022-12-20 PROCEDURE — 86803 HEPATITIS C ANTIBODY: ICD-10-PCS | Mod: ,,, | Performed by: CLINICAL MEDICAL LABORATORY

## 2022-12-20 PROCEDURE — 80061 LIPID PANEL: CPT | Mod: ,,, | Performed by: CLINICAL MEDICAL LABORATORY

## 2022-12-20 PROCEDURE — 80053 COMPREHEN METABOLIC PANEL: CPT | Mod: ,,, | Performed by: CLINICAL MEDICAL LABORATORY

## 2022-12-20 PROCEDURE — 87389 HIV 1 / 2 ANTIBODY: ICD-10-PCS | Mod: ,,, | Performed by: CLINICAL MEDICAL LABORATORY

## 2022-12-20 PROCEDURE — 1160F PR REVIEW ALL MEDS BY PRESCRIBER/CLIN PHARMACIST DOCUMENTED: ICD-10-PCS | Mod: CPTII,,, | Performed by: NURSE PRACTITIONER

## 2022-12-20 PROCEDURE — 3066F NEPHROPATHY DOC TX: CPT | Mod: CPTII,,, | Performed by: NURSE PRACTITIONER

## 2022-12-20 PROCEDURE — 80061 LIPID PANEL: ICD-10-PCS | Mod: ,,, | Performed by: CLINICAL MEDICAL LABORATORY

## 2022-12-20 RX ORDER — MELOXICAM 15 MG/1
15 TABLET ORAL DAILY PRN
Qty: 90 TABLET | Refills: 1 | Status: SHIPPED | OUTPATIENT
Start: 2022-12-20 | End: 2023-06-15 | Stop reason: SDUPTHER

## 2022-12-20 RX ORDER — POTASSIUM CHLORIDE 20 MEQ/1
20 TABLET, EXTENDED RELEASE ORAL DAILY
Qty: 90 TABLET | Refills: 1 | Status: SHIPPED | OUTPATIENT
Start: 2022-12-20 | End: 2023-06-15 | Stop reason: SDUPTHER

## 2022-12-20 RX ORDER — OLMESARTAN MEDOXOMIL, AMLODIPINE AND HYDROCHLOROTHIAZIDE TABLET 40/10/25 MG 40; 10; 25 MG/1; MG/1; MG/1
1 TABLET ORAL DAILY
Qty: 90 TABLET | Refills: 1 | Status: SHIPPED | OUTPATIENT
Start: 2022-12-20 | End: 2023-06-15 | Stop reason: SDUPTHER

## 2022-12-20 RX ORDER — OMEPRAZOLE 20 MG/1
20 CAPSULE, DELAYED RELEASE ORAL DAILY
Qty: 90 CAPSULE | Refills: 1 | Status: SHIPPED | OUTPATIENT
Start: 2022-12-20 | End: 2023-06-15 | Stop reason: SDUPTHER

## 2022-12-20 NOTE — PROGRESS NOTES
"   DULCE MARIA Dsouza   [facility] [address] [place] [address]  [phone]      PATIENT NAME: Kingsley Bridges  : 1965  DATE: 22  MRN: [id number]      Billing Provider: DULCE MARIA Dsouza  Level of Service:   Patient PCP Information       Provider PCP Type    DULCE MARIA Dsouza General            Reason for Visit / Chief Complaint: Follow-up, Wrist Pain, Hand Pain (6 mos), and Hypertension       Update PCP  Update Chief Complaint         History of Present Illness / Problem Focused Workflow     Kingsley Bridges presents to the clinic with Follow-up, Wrist Pain, Hand Pain (6 mos), and Hypertension     Routine follow up with lab and med refills. Overall doing well.     Pt reports he hurt his left wrist several months ago, he did not seek medical attention at the time. He reports he was waiting for xray to be done here. He reports stiffness and occasional aching. He states he was working on a tractor and his arm "got in a bind". Since that time his wrist has hurt off/on. Area is non tender, good hand . No swelling or deformity noted.     Bp elevated, repeat much improved.   Advised to monitor BP at home. Advised on optimal BP readings - SBP < 130 & DBP < 80. Advised to call office for any persistent BP elevation and may have to prescribe or adjust BP med(s).  Recommended DASH diet, stay well hydrated with water daily, eliminate or decrease caffeinated and high calorie drinks, increase physical activity, and lose weight if BMI > 25.0. bring log next appt.         Review of Systems     Review of Systems   Constitutional:  Negative for fatigue and fever.   HENT:  Negative for nasal congestion and sore throat.    Eyes:  Negative for visual disturbance.   Respiratory:  Negative for chest tightness and shortness of breath.    Cardiovascular:  Negative for chest pain and leg swelling. "   Gastrointestinal:  Negative for abdominal pain, change in bowel habit and change in bowel habit.   Endocrine: Negative for polydipsia, polyphagia and polyuria.   Genitourinary:  Negative for dysuria and hematuria.   Musculoskeletal:  Positive for myalgias. Negative for back pain and leg pain.   Integumentary:  Negative for rash.   Neurological:  Negative for dizziness, syncope, weakness and light-headedness.      Medical / Social / Family History     Past Medical History:   Diagnosis Date    Acid reflux     Hypertension        Past Surgical History:   Procedure Laterality Date    KNEE SURGERY Left     KNEE SURGERY Right        Social History  Mr. Bridges  reports that he has never smoked. He has never used smokeless tobacco. He reports current alcohol use. He reports that he does not use drugs.    Family History  Mr. Bridges's family history includes Diabetes in his father; Heart disease in his brother; No Known Problems in his mother.    Medications and Allergies     Medications  No outpatient medications have been marked as taking for the 12/20/22 encounter (Office Visit) with DULCE MARIA Dsouza.       Allergies  Review of patient's allergies indicates:  No Known Allergies    Physical Examination     Vitals:    12/20/22 1116   BP: 132/84   Pulse:    Resp:    Temp:      Physical Exam  Eyes:      Pupils: Pupils are equal, round, and reactive to light.   Cardiovascular:      Rate and Rhythm: Normal rate and regular rhythm.      Heart sounds: Normal heart sounds. No murmur heard.  Pulmonary:      Breath sounds: Normal breath sounds. No wheezing, rhonchi or rales.   Abdominal:      General: Bowel sounds are normal.   Musculoskeletal:         General: No swelling, tenderness or deformity. Normal range of motion.      Cervical back: Normal range of motion and neck supple.   Skin:     General: Skin is warm and dry.   Neurological:      Mental Status: He is alert and oriented to person, place, and time.      imaging:  X-Ray Wrist Complete 3 views Left    Result Date: 12/20/2022  EXAMINATION: XR WRIST COMPLETE 3 VIEWS LEFT CLINICAL HISTORY: Pain in left wrist COMPARISON: None available FINDINGS: No evidence of fracture seen.  The alignment of the joints appears normal.  Mild radiocarpal joint degenerative change is present.  No soft tissue abnormality is seen.     Mild wrist osteoarthrosis. Electronically signed by: Ray Jason Date:    12/20/2022 Time:    09:16  Assessment and Plan (including Health Maintenance)      Problem List  Smart Sets  Document Outside HM   :    Plan:   Xrays reviewed  Cont home meds  Home bp monitoring       Health Maintenance Due   Topic Date Due    Hepatitis C Screening  Never done    HIV Screening  Never done    TETANUS VACCINE  Never done    Shingles Vaccine (1 of 2) Never done    COVID-19 Vaccine (3 - Booster for Moderna series) 06/03/2021    Influenza Vaccine (1) Never done       Problem List Items Addressed This Visit          Cardiac/Vascular    Hypertension - Primary    Relevant Medications    olmesartan-amLODIPin-hcthiazid 40-10-25 mg Tab    potassium chloride SA (KLOR-CON M20) 20 MEQ tablet    Other Relevant Orders    CBC Auto Differential    Comprehensive Metabolic Panel    Hyperlipidemia    Relevant Orders    Lipid Panel       GI    Gastroesophageal reflux disease    Relevant Medications    omeprazole (PRILOSEC) 20 MG capsule       Orthopedic    Arthritis    Relevant Medications    meloxicam (MOBIC) 15 MG tablet     Other Visit Diagnoses       Screening for viral disease        Relevant Orders    Hepatitis C Antibody    HIV 1/2 Ag/Ab (4th Gen)    Left wrist pain        xray reviewed    Relevant Orders    X-Ray Wrist Complete 3 views Left (Completed)            Health Maintenance Topics with due status: Not Due       Topic Last Completion Date    Colorectal Cancer Screening 11/05/2015    PROSTATE-SPECIFIC ANTIGEN 06/29/2022    Diabetes Urine Screening 06/29/2022    Lipid Panel 06/29/2022     Hemoglobin A1c 06/29/2022       Future Appointments   Date Time Provider Department Center   6/21/2023  8:40 AM DULCE MARIA Dsouza [facility]            Signature:  DULCE MARIA Dsouza [facility] [address] [place] [address]  [phone]    Date of encounter: 12/20/22

## 2023-06-21 ENCOUNTER — OFFICE VISIT (OUTPATIENT)
Dept: FAMILY MEDICINE | Facility: CLINIC | Age: 58
End: 2023-06-21
Payer: COMMERCIAL

## 2023-06-21 VITALS
HEART RATE: 61 BPM | HEIGHT: 68 IN | TEMPERATURE: 98 F | BODY MASS INDEX: 42.44 KG/M2 | DIASTOLIC BLOOD PRESSURE: 96 MMHG | OXYGEN SATURATION: 96 % | SYSTOLIC BLOOD PRESSURE: 171 MMHG | WEIGHT: 280 LBS | RESPIRATION RATE: 16 BRPM

## 2023-06-21 DIAGNOSIS — E78.5 HYPERLIPIDEMIA, UNSPECIFIED HYPERLIPIDEMIA TYPE: ICD-10-CM

## 2023-06-21 DIAGNOSIS — M19.90 ARTHRITIS: ICD-10-CM

## 2023-06-21 DIAGNOSIS — K21.9 GASTROESOPHAGEAL REFLUX DISEASE, UNSPECIFIED WHETHER ESOPHAGITIS PRESENT: ICD-10-CM

## 2023-06-21 DIAGNOSIS — I10 HYPERTENSION, UNSPECIFIED TYPE: Primary | ICD-10-CM

## 2023-06-21 DIAGNOSIS — R73.01 IMPAIRED FASTING GLUCOSE: ICD-10-CM

## 2023-06-21 DIAGNOSIS — Z78.9: ICD-10-CM

## 2023-06-21 DIAGNOSIS — I10 HYPERTENSION, UNSPECIFIED TYPE: ICD-10-CM

## 2023-06-21 DIAGNOSIS — Z12.5 SCREENING FOR PROSTATE CANCER: ICD-10-CM

## 2023-06-21 LAB
ALBUMIN SERPL BCP-MCNC: 3.9 G/DL (ref 3.5–5)
ALBUMIN/GLOB SERPL: 1 {RATIO}
ALP SERPL-CCNC: 67 U/L (ref 45–115)
ALT SERPL W P-5'-P-CCNC: 46 U/L (ref 16–61)
ANION GAP SERPL CALCULATED.3IONS-SCNC: 12 MMOL/L (ref 7–16)
AST SERPL W P-5'-P-CCNC: 25 U/L (ref 15–37)
BILIRUB SERPL-MCNC: 0.8 MG/DL (ref ?–1.2)
BUN SERPL-MCNC: 12 MG/DL (ref 7–18)
BUN/CREAT SERPL: 14 (ref 6–20)
CALCIUM SERPL-MCNC: 9.1 MG/DL (ref 8.5–10.1)
CHLORIDE SERPL-SCNC: 104 MMOL/L (ref 98–107)
CHOLEST SERPL-MCNC: 179 MG/DL (ref 0–200)
CHOLEST/HDLC SERPL: 2.6 {RATIO}
CO2 SERPL-SCNC: 29 MMOL/L (ref 21–32)
CREAT SERPL-MCNC: 0.83 MG/DL (ref 0.7–1.3)
CREAT UR-MCNC: 65 MG/DL (ref 39–259)
EGFR (NO RACE VARIABLE) (RUSH/TITUS): 101 ML/MIN/1.73M2
EST. AVERAGE GLUCOSE BLD GHB EST-MCNC: 97 MG/DL
GLOBULIN SER-MCNC: 4 G/DL (ref 2–4)
GLUCOSE SERPL-MCNC: 116 MG/DL (ref 74–106)
HBA1C MFR BLD HPLC: 5.5 % (ref 4.5–6.6)
HDLC SERPL-MCNC: 70 MG/DL (ref 40–60)
LDLC SERPL CALC-MCNC: 95 MG/DL
LDLC/HDLC SERPL: 1.4 {RATIO}
MICROALBUMIN UR-MCNC: 10.4 MG/DL (ref 0–2.8)
MICROALBUMIN/CREAT RATIO PNL UR: 160 MG/G (ref 0–30)
NONHDLC SERPL-MCNC: 109 MG/DL
POTASSIUM SERPL-SCNC: 3.6 MMOL/L (ref 3.5–5.1)
PROT SERPL-MCNC: 7.9 G/DL (ref 6.4–8.2)
PSA SERPL-MCNC: 0.4 NG/ML
SODIUM SERPL-SCNC: 141 MMOL/L (ref 136–145)
TRIGL SERPL-MCNC: 71 MG/DL (ref 35–150)
VLDLC SERPL-MCNC: 14 MG/DL

## 2023-06-21 PROCEDURE — 99213 PR OFFICE/OUTPT VISIT, EST, LEVL III, 20-29 MIN: ICD-10-PCS | Mod: 25,,, | Performed by: NURSE PRACTITIONER

## 2023-06-21 PROCEDURE — 80061 LIPID PANEL: ICD-10-PCS | Mod: ,,, | Performed by: CLINICAL MEDICAL LABORATORY

## 2023-06-21 PROCEDURE — 83036 HEMOGLOBIN A1C: ICD-10-PCS | Mod: ,,, | Performed by: CLINICAL MEDICAL LABORATORY

## 2023-06-21 PROCEDURE — 3077F SYST BP >= 140 MM HG: CPT | Mod: CPTII,,, | Performed by: NURSE PRACTITIONER

## 2023-06-21 PROCEDURE — 90471 TDAP VACCINE GREATER THAN OR EQUAL TO 7YO IM: ICD-10-PCS | Mod: ,,, | Performed by: NURSE PRACTITIONER

## 2023-06-21 PROCEDURE — 3080F DIAST BP >= 90 MM HG: CPT | Mod: CPTII,,, | Performed by: NURSE PRACTITIONER

## 2023-06-21 PROCEDURE — 90715 TDAP VACCINE 7 YRS/> IM: CPT | Mod: ,,, | Performed by: NURSE PRACTITIONER

## 2023-06-21 PROCEDURE — 83036 HEMOGLOBIN GLYCOSYLATED A1C: CPT | Mod: ,,, | Performed by: CLINICAL MEDICAL LABORATORY

## 2023-06-21 PROCEDURE — G0103 PSA SCREENING: HCPCS | Mod: ,,, | Performed by: CLINICAL MEDICAL LABORATORY

## 2023-06-21 PROCEDURE — 3008F BODY MASS INDEX DOCD: CPT | Mod: CPTII,,, | Performed by: NURSE PRACTITIONER

## 2023-06-21 PROCEDURE — 82043 UR ALBUMIN QUANTITATIVE: CPT | Mod: ,,, | Performed by: CLINICAL MEDICAL LABORATORY

## 2023-06-21 PROCEDURE — 1160F RVW MEDS BY RX/DR IN RCRD: CPT | Mod: CPTII,,, | Performed by: NURSE PRACTITIONER

## 2023-06-21 PROCEDURE — 82570 ASSAY OF URINE CREATININE: CPT | Mod: ,,, | Performed by: CLINICAL MEDICAL LABORATORY

## 2023-06-21 PROCEDURE — 1159F PR MEDICATION LIST DOCUMENTED IN MEDICAL RECORD: ICD-10-PCS | Mod: CPTII,,, | Performed by: NURSE PRACTITIONER

## 2023-06-21 PROCEDURE — 1159F MED LIST DOCD IN RCRD: CPT | Mod: CPTII,,, | Performed by: NURSE PRACTITIONER

## 2023-06-21 PROCEDURE — 80061 LIPID PANEL: CPT | Mod: ,,, | Performed by: CLINICAL MEDICAL LABORATORY

## 2023-06-21 PROCEDURE — 3080F PR MOST RECENT DIASTOLIC BLOOD PRESSURE >= 90 MM HG: ICD-10-PCS | Mod: CPTII,,, | Performed by: NURSE PRACTITIONER

## 2023-06-21 PROCEDURE — 90471 IMMUNIZATION ADMIN: CPT | Mod: ,,, | Performed by: NURSE PRACTITIONER

## 2023-06-21 PROCEDURE — 99213 OFFICE O/P EST LOW 20 MIN: CPT | Mod: 25,,, | Performed by: NURSE PRACTITIONER

## 2023-06-21 PROCEDURE — 90715 TDAP VACCINE GREATER THAN OR EQUAL TO 7YO IM: ICD-10-PCS | Mod: ,,, | Performed by: NURSE PRACTITIONER

## 2023-06-21 PROCEDURE — 3008F PR BODY MASS INDEX (BMI) DOCUMENTED: ICD-10-PCS | Mod: CPTII,,, | Performed by: NURSE PRACTITIONER

## 2023-06-21 PROCEDURE — 82570 MICROALBUMIN / CREATININE RATIO URINE: ICD-10-PCS | Mod: ,,, | Performed by: CLINICAL MEDICAL LABORATORY

## 2023-06-21 PROCEDURE — 80053 COMPREHEN METABOLIC PANEL: CPT | Mod: ,,, | Performed by: CLINICAL MEDICAL LABORATORY

## 2023-06-21 PROCEDURE — 1160F PR REVIEW ALL MEDS BY PRESCRIBER/CLIN PHARMACIST DOCUMENTED: ICD-10-PCS | Mod: CPTII,,, | Performed by: NURSE PRACTITIONER

## 2023-06-21 PROCEDURE — G0103 PSA, SCREENING: ICD-10-PCS | Mod: ,,, | Performed by: CLINICAL MEDICAL LABORATORY

## 2023-06-21 PROCEDURE — 82043 MICROALBUMIN / CREATININE RATIO URINE: ICD-10-PCS | Mod: ,,, | Performed by: CLINICAL MEDICAL LABORATORY

## 2023-06-21 PROCEDURE — 3077F PR MOST RECENT SYSTOLIC BLOOD PRESSURE >= 140 MM HG: ICD-10-PCS | Mod: CPTII,,, | Performed by: NURSE PRACTITIONER

## 2023-06-21 PROCEDURE — 80053 COMPREHENSIVE METABOLIC PANEL: ICD-10-PCS | Mod: ,,, | Performed by: CLINICAL MEDICAL LABORATORY

## 2023-06-21 RX ORDER — OLMESARTAN MEDOXOMIL, AMLODIPINE AND HYDROCHLOROTHIAZIDE TABLET 40/10/25 MG 40; 10; 25 MG/1; MG/1; MG/1
1 TABLET ORAL DAILY
Qty: 90 TABLET | Refills: 1 | Status: SHIPPED | OUTPATIENT
Start: 2023-06-21

## 2023-06-21 RX ORDER — MELOXICAM 15 MG/1
15 TABLET ORAL DAILY PRN
Qty: 90 TABLET | Refills: 1 | Status: SHIPPED | OUTPATIENT
Start: 2023-06-21

## 2023-06-21 RX ORDER — POTASSIUM CHLORIDE 20 MEQ/1
20 TABLET, EXTENDED RELEASE ORAL DAILY
Qty: 90 TABLET | Refills: 1 | Status: SHIPPED | OUTPATIENT
Start: 2023-06-21

## 2023-06-21 RX ORDER — OMEPRAZOLE 20 MG/1
20 CAPSULE, DELAYED RELEASE ORAL DAILY
Qty: 90 CAPSULE | Refills: 1 | Status: SHIPPED | OUTPATIENT
Start: 2023-06-21

## 2023-06-21 NOTE — ASSESSMENT & PLAN NOTE
BP Readings from Last 3 Encounters:   06/21/23 (!) 171/96   12/20/22 132/84   06/29/22 132/88   goal less 130/80  Take meds prior to next appt  Encouraged home monitoring  DASH diet

## 2023-06-21 NOTE — ASSESSMENT & PLAN NOTE
Lab Results   Component Value Date    CHOL 177 12/20/2022    CHOL 163 06/29/2022    CHOL 156 10/13/2021     Lab Results   Component Value Date    HDL 64 (H) 12/20/2022    HDL 65 (H) 06/29/2022    HDL 69 (H) 10/13/2021     Lab Results   Component Value Date    LDLCALC 100 12/20/2022    LDLCALC 85 06/29/2022    LDLCALC 78 10/13/2021     No results found for: DLDL  Lab Results   Component Value Date    TRIG 66 12/20/2022    TRIG 66 06/29/2022    TRIG 45 10/13/2021       f1   Lab Results   Component Value Date    CHOLHDL 2.8 12/20/2022    CHOLHDL 2.5 06/29/2022    CHOLHDL 2.3 10/13/2021      Nutrition Care Plan    Nutrition Diagnosis:      Increased nutrient needs  related to appetite change  as evidenced by BMI of 18.3, recent loss of ~10# post CABG (6/2022)(9.8%of her wt)       Intervention:  Modified diet:   recommend continue cardiac diet,and liberalize if diet intake <50%  Commercial beverage:   offering 1 x a day: high protein standard nutrition supplement: 350kcal, 20 g protein; she has drank these during previous admits  Vitamin:   recommend continue of Fe,Folic,B1    Tips to increase nutrition intake were placed in AVS    Estimated daily needs: 1600-1800kcal, 55-68 g protein    Monitoring and Evaluation:  Amount of food:   goal is to consume >/=75% of meals/supplements  Weight:   goal is for no further unintended wt loss

## 2023-06-21 NOTE — PROGRESS NOTES
DULCE MARIA Dsouza   [facility] [address] [place] [address]  [phone]      PATIENT NAME: Kingsley Bridges  : 1965  DATE: 23  MRN: [id number]      Billing Provider: DULCE MARIA Dsouza  Level of Service:   Patient PCP Information       Provider PCP Type    DULCE MARIA Dsouza General            Reason for Visit / Chief Complaint: Follow-up and Hypertension       Update PCP  Update Chief Complaint         History of Present Illness / Problem Focused Workflow     Kingsley Bridges presents to the clinic with Follow-up and Hypertension     Pt presents for routine follow up with lab and med refills. Overall doing well.      BP appears poorly controlled today. Home meds reviewed. Pt reports no meds this am. Reports increased recent stresses. Encouraged pt to take meds prior to next appt.   Advised to monitor BP at home. Advised on optimal BP readings - SBP < 130 & DBP < 80. Advised to call office for any persistent BP elevation and may have to prescribe or adjust BP med(s).  Recommended DASH diet, stay well hydrated with water daily, eliminate or decrease caffeinated and high calorie drinks, increase physical activity, and lose weight if BMI > 25.0.      Discussed need for low fat/low cholesterol diet, regular exercise, and weight control.   Cardiovascular risk and specific lipid/LDL goals reviewed.      Review of Systems     Review of Systems   Constitutional:  Negative for fatigue and fever.   HENT:  Negative for nasal congestion and sore throat.    Eyes:  Negative for visual disturbance.   Respiratory:  Negative for chest tightness and shortness of breath.    Cardiovascular:  Negative for chest pain and leg swelling.   Gastrointestinal:  Negative for abdominal pain, change in bowel habit and change in bowel habit.   Endocrine: Negative for polydipsia, polyphagia and polyuria.   Genitourinary:   Negative for dysuria and hematuria.   Musculoskeletal:  Negative for back pain and leg pain.   Integumentary:  Negative for rash.   Neurological:  Negative for dizziness, syncope, weakness and light-headedness.      Medical / Social / Family History     Past Medical History:   Diagnosis Date    Acid reflux     Hypertension        Past Surgical History:   Procedure Laterality Date    KNEE SURGERY Left     KNEE SURGERY Right        Social History  Mr. Bridges  reports that he has never smoked. He has never used smokeless tobacco. He reports current alcohol use. He reports that he does not use drugs.    Family History  Mr. Bridges's family history includes Diabetes in his father; Heart disease in his brother; No Known Problems in his mother.    Medications and Allergies     Medications  No outpatient medications have been marked as taking for the 6/21/23 encounter (Office Visit) with DULCE MARIA Dsouza.       Allergies  Review of patient's allergies indicates:  No Known Allergies    Physical Examination     Vitals:    06/21/23 0913   BP: (!) 171/96   Pulse: 61   Resp: 16   Temp: 97.6 °F (36.4 °C)     Physical Exam  Eyes:      Pupils: Pupils are equal, round, and reactive to light.   Cardiovascular:      Rate and Rhythm: Normal rate and regular rhythm.      Heart sounds: Normal heart sounds. No murmur heard.  Pulmonary:      Breath sounds: Normal breath sounds. No wheezing, rhonchi or rales.   Abdominal:      General: Bowel sounds are normal.   Musculoskeletal:         General: No swelling.      Cervical back: Normal range of motion and neck supple.   Skin:     General: Skin is warm and dry.   Neurological:      Mental Status: He is alert and oriented to person, place, and time.        Lab Results   Component Value Date    WBC 5.67 12/20/2022    HGB 12.9 (L) 12/20/2022    HCT 40.5 12/20/2022    MCV 87.7 12/20/2022     12/20/2022        Sodium   Date Value Ref Range Status   12/20/2022 140 136 - 145 mmol/L Final      Potassium   Date Value Ref Range Status   12/20/2022 3.8 3.5 - 5.1 mmol/L Final     Chloride   Date Value Ref Range Status   12/20/2022 104 98 - 107 mmol/L Final     CO2   Date Value Ref Range Status   12/20/2022 30 21 - 32 mmol/L Final     Glucose   Date Value Ref Range Status   12/20/2022 128 (H) 74 - 106 mg/dL Final     BUN   Date Value Ref Range Status   12/20/2022 12 7 - 18 mg/dL Final     Creatinine   Date Value Ref Range Status   12/20/2022 0.86 0.70 - 1.30 mg/dL Final     Calcium   Date Value Ref Range Status   12/20/2022 8.9 8.5 - 10.1 mg/dL Final     Total Protein   Date Value Ref Range Status   12/20/2022 7.2 6.4 - 8.2 g/dL Final     Albumin   Date Value Ref Range Status   12/20/2022 4.0 3.5 - 5.0 g/dL Final     Bilirubin, Total   Date Value Ref Range Status   12/20/2022 0.7 >0.0 - 1.2 mg/dL Final     Alk Phos   Date Value Ref Range Status   12/20/2022 71 45 - 115 U/L Final     AST   Date Value Ref Range Status   12/20/2022 24 15 - 37 U/L Final     ALT   Date Value Ref Range Status   12/20/2022 39 16 - 61 U/L Final     Anion Gap   Date Value Ref Range Status   12/20/2022 10 7 - 16 mmol/L Final     eGFR   Date Value Ref Range Status   12/20/2022 100 >=60 mL/min/1.73m² Final      Lab Results   Component Value Date    HGBA1C 5.1 06/29/2022      Lab Results   Component Value Date    CHOL 177 12/20/2022    CHOL 163 06/29/2022    CHOL 156 10/13/2021     Lab Results   Component Value Date    HDL 64 (H) 12/20/2022    HDL 65 (H) 06/29/2022    HDL 69 (H) 10/13/2021     Lab Results   Component Value Date    LDLCALC 100 12/20/2022    LDLCALC 85 06/29/2022    LDLCALC 78 10/13/2021     No results found for: DLDL  Lab Results   Component Value Date    TRIG 66 12/20/2022    TRIG 66 06/29/2022    TRIG 45 10/13/2021     Lab Results   Component Value Date    CHOLHDL 2.8 12/20/2022    CHOLHDL 2.5 06/29/2022    CHOLHDL 2.3 10/13/2021      Lab Results   Component Value Date    TSH 1.520 06/29/2022        Assessment and  Plan (including Health Maintenance)      Problem List  Smart Sets  Document Outside HM   :    Plan:     1. Hypertension, unspecified type  Assessment & Plan:  BP Readings from Last 3 Encounters:   06/21/23 (!) 171/96   12/20/22 132/84   06/29/22 132/88   goal less 130/80  Take meds prior to next appt  Encouraged home monitoring  DASH diet    Orders:  -     Comprehensive Metabolic Panel; Future; Expected date: 06/21/2023  -     Microalbumin/Creatinine Ratio, Urine; Future; Expected date: 06/21/2023  -     olmesartan-amLODIPin-hcthiazid 40-10-25 mg Tab; Take 1 tablet by mouth once daily.  Dispense: 90 tablet; Refill: 1  -     potassium chloride SA (KLOR-CON M20) 20 MEQ tablet; Take 1 tablet (20 mEq total) by mouth once daily.  Dispense: 90 tablet; Refill: 1    2. Hyperlipidemia, unspecified hyperlipidemia type  Assessment & Plan:  Lab Results   Component Value Date    CHOL 177 12/20/2022    CHOL 163 06/29/2022    CHOL 156 10/13/2021     Lab Results   Component Value Date    HDL 64 (H) 12/20/2022    HDL 65 (H) 06/29/2022    HDL 69 (H) 10/13/2021     Lab Results   Component Value Date    LDLCALC 100 12/20/2022    LDLCALC 85 06/29/2022    LDLCALC 78 10/13/2021     No results found for: DLDL  Lab Results   Component Value Date    TRIG 66 12/20/2022    TRIG 66 06/29/2022    TRIG 45 10/13/2021       f1   Lab Results   Component Value Date    CHOLHDL 2.8 12/20/2022    CHOLHDL 2.5 06/29/2022    CHOLHDL 2.3 10/13/2021       Orders:  -     Lipid Panel; Future; Expected date: 06/21/2023    3. Impaired fasting glucose  -     Hemoglobin A1C; Future; Expected date: 06/21/2023    4. Screening for prostate cancer  -     PSA, Screening; Future; Expected date: 06/21/2023    5. Arthritis  -     meloxicam (MOBIC) 15 MG tablet; Take 1 tablet (15 mg total) by mouth daily as needed for Pain.  Dispense: 90 tablet; Refill: 1    6. Hypertension, unspecified type  Comments:  encourage home bp monitoring  bring log next appt  Assessment &  Plan:  BP Readings from Last 3 Encounters:   06/21/23 (!) 171/96   12/20/22 132/84   06/29/22 132/88   goal less 130/80  Take meds prior to next appt  Encouraged home monitoring  DASH diet    Orders:  -     Comprehensive Metabolic Panel; Future; Expected date: 06/21/2023  -     Microalbumin/Creatinine Ratio, Urine; Future; Expected date: 06/21/2023  -     olmesartan-amLODIPin-hcthiazid 40-10-25 mg Tab; Take 1 tablet by mouth once daily.  Dispense: 90 tablet; Refill: 1  -     potassium chloride SA (KLOR-CON M20) 20 MEQ tablet; Take 1 tablet (20 mEq total) by mouth once daily.  Dispense: 90 tablet; Refill: 1    7. Gastroesophageal reflux disease, unspecified whether esophagitis present  -     omeprazole (PRILOSEC) 20 MG capsule; Take 1 capsule (20 mg total) by mouth once daily.  Dispense: 90 capsule; Refill: 1    8. Tetanus toxoid vaccination administered within the past year  -     Tdap Vaccine         There are no Patient Instructions on file for this visit.     Health Maintenance Due   Topic Date Due    Diabetes Urine Screening  06/29/2023         Health Maintenance Topics with due status: Not Due       Topic Last Completion Date    Colorectal Cancer Screening 11/05/2015    PROSTATE-SPECIFIC ANTIGEN 06/29/2022    Hemoglobin A1c 06/29/2022    Lipid Panel 12/20/2022    TETANUS VACCINE 06/21/2023    Influenza Vaccine Not Due       Future Appointments   Date Time Provider Department Center   12/6/2023  8:40 AM DULCE MARIA Dsouza [facility]            Signature:  DULCE MARIA Dsouza [facility] [address] [place] [address]  [phone]    Date of encounter: 6/21/23

## 2023-08-21 ENCOUNTER — PATIENT OUTREACH (OUTPATIENT)
Dept: ADMINISTRATIVE | Facility: HOSPITAL | Age: 58
End: 2023-08-21

## 2023-08-21 NOTE — PROGRESS NOTES
08/21/2023   --Chart accessed for: GAP REPORT  --Care Gaps addressed: HTN  Outreach made to patient via TELEPHONE--LEFT MESSAGE  Care Everywhere updates requested and reviewed.  Media reports reviewed.  [facility] and [facility] reviewed.  Immunization Database ([employer]/MIXX) reviewed. Vaccinations uploaded: NONE  HAC abstracted.  Next appointment 12/06/2023 . Appointment notes updated to include: --LAST BP NONCOMPLIANT

## 2024-04-17 RX ORDER — POTASSIUM CHLORIDE 20 MEQ/1
20 TABLET, EXTENDED RELEASE ORAL DAILY
Qty: 90 TABLET | Refills: 0 | Status: CANCELLED | OUTPATIENT
Start: 2024-04-17

## 2024-04-17 RX ORDER — OMEPRAZOLE 20 MG/1
20 CAPSULE, DELAYED RELEASE ORAL DAILY
Qty: 90 CAPSULE | Refills: 0 | Status: CANCELLED | OUTPATIENT
Start: 2024-04-17

## 2024-04-18 ENCOUNTER — OFFICE VISIT (OUTPATIENT)
Dept: FAMILY MEDICINE | Facility: CLINIC | Age: 59
End: 2024-04-18
Payer: COMMERCIAL

## 2024-04-18 VITALS
BODY MASS INDEX: 43.55 KG/M2 | TEMPERATURE: 98 F | WEIGHT: 287.38 LBS | HEIGHT: 68 IN | DIASTOLIC BLOOD PRESSURE: 100 MMHG | OXYGEN SATURATION: 96 % | HEART RATE: 62 BPM | RESPIRATION RATE: 16 BRPM | SYSTOLIC BLOOD PRESSURE: 176 MMHG

## 2024-04-18 DIAGNOSIS — K21.9 GASTROESOPHAGEAL REFLUX DISEASE, UNSPECIFIED WHETHER ESOPHAGITIS PRESENT: ICD-10-CM

## 2024-04-18 DIAGNOSIS — E78.5 HYPERLIPIDEMIA, UNSPECIFIED HYPERLIPIDEMIA TYPE: ICD-10-CM

## 2024-04-18 DIAGNOSIS — M19.90 ARTHRITIS: ICD-10-CM

## 2024-04-18 DIAGNOSIS — I10 HYPERTENSION, UNSPECIFIED TYPE: Primary | ICD-10-CM

## 2024-04-18 LAB
ALBUMIN SERPL BCP-MCNC: 4 G/DL (ref 3.5–5)
ALBUMIN/GLOB SERPL: 1.1 {RATIO}
ALP SERPL-CCNC: 75 U/L (ref 45–115)
ALT SERPL W P-5'-P-CCNC: 48 U/L (ref 16–61)
ANION GAP SERPL CALCULATED.3IONS-SCNC: 9 MMOL/L (ref 7–16)
AST SERPL W P-5'-P-CCNC: 19 U/L (ref 15–37)
BASOPHILS # BLD AUTO: 0.04 K/UL (ref 0–0.2)
BASOPHILS NFR BLD AUTO: 0.7 % (ref 0–1)
BILIRUB SERPL-MCNC: 0.9 MG/DL (ref ?–1.2)
BUN SERPL-MCNC: 10 MG/DL (ref 7–18)
BUN/CREAT SERPL: 11 (ref 6–20)
CALCIUM SERPL-MCNC: 9.5 MG/DL (ref 8.5–10.1)
CHLORIDE SERPL-SCNC: 106 MMOL/L (ref 98–107)
CHOLEST SERPL-MCNC: 165 MG/DL (ref 0–200)
CHOLEST/HDLC SERPL: 2.5 {RATIO}
CO2 SERPL-SCNC: 31 MMOL/L (ref 21–32)
CREAT SERPL-MCNC: 0.92 MG/DL (ref 0.7–1.3)
DIFFERENTIAL METHOD BLD: ABNORMAL
EGFR (NO RACE VARIABLE) (RUSH/TITUS): 96 ML/MIN/1.73M2
EOSINOPHIL # BLD AUTO: 0.21 K/UL (ref 0–0.5)
EOSINOPHIL NFR BLD AUTO: 3.9 % (ref 1–4)
ERYTHROCYTE [DISTWIDTH] IN BLOOD BY AUTOMATED COUNT: 14.4 % (ref 11.5–14.5)
GLOBULIN SER-MCNC: 3.7 G/DL (ref 2–4)
GLUCOSE SERPL-MCNC: 113 MG/DL (ref 74–106)
HCT VFR BLD AUTO: 37.6 % (ref 40–54)
HDLC SERPL-MCNC: 67 MG/DL (ref 40–60)
HGB BLD-MCNC: 11.8 G/DL (ref 13.5–18)
IMM GRANULOCYTES # BLD AUTO: 0.01 K/UL (ref 0–0.04)
IMM GRANULOCYTES NFR BLD: 0.2 % (ref 0–0.4)
LDLC SERPL CALC-MCNC: 89 MG/DL
LDLC/HDLC SERPL: 1.3 {RATIO}
LYMPHOCYTES # BLD AUTO: 1.65 K/UL (ref 1–4.8)
LYMPHOCYTES NFR BLD AUTO: 30.6 % (ref 27–41)
MCH RBC QN AUTO: 27.3 PG (ref 27–31)
MCHC RBC AUTO-ENTMCNC: 31.4 G/DL (ref 32–36)
MCV RBC AUTO: 86.8 FL (ref 80–96)
MONOCYTES # BLD AUTO: 0.39 K/UL (ref 0–0.8)
MONOCYTES NFR BLD AUTO: 7.2 % (ref 2–6)
MPC BLD CALC-MCNC: 12.3 FL (ref 9.4–12.4)
NEUTROPHILS # BLD AUTO: 3.1 K/UL (ref 1.8–7.7)
NEUTROPHILS NFR BLD AUTO: 57.4 % (ref 53–65)
NONHDLC SERPL-MCNC: 98 MG/DL
NRBC # BLD AUTO: 0 X10E3/UL
NRBC, AUTO (.00): 0 %
PLATELET # BLD AUTO: 210 K/UL (ref 150–400)
POTASSIUM SERPL-SCNC: 4.1 MMOL/L (ref 3.5–5.1)
PROT SERPL-MCNC: 7.7 G/DL (ref 6.4–8.2)
RBC # BLD AUTO: 4.33 M/UL (ref 4.6–6.2)
SODIUM SERPL-SCNC: 142 MMOL/L (ref 136–145)
TRIGL SERPL-MCNC: 45 MG/DL (ref 35–150)
VLDLC SERPL-MCNC: 9 MG/DL
WBC # BLD AUTO: 5.4 K/UL (ref 4.5–11)

## 2024-04-18 PROCEDURE — 85025 COMPLETE CBC W/AUTO DIFF WBC: CPT | Mod: ,,, | Performed by: CLINICAL MEDICAL LABORATORY

## 2024-04-18 PROCEDURE — 1159F MED LIST DOCD IN RCRD: CPT | Mod: CPTII,,, | Performed by: NURSE PRACTITIONER

## 2024-04-18 PROCEDURE — 1160F RVW MEDS BY RX/DR IN RCRD: CPT | Mod: CPTII,,, | Performed by: NURSE PRACTITIONER

## 2024-04-18 PROCEDURE — 80053 COMPREHEN METABOLIC PANEL: CPT | Mod: ,,, | Performed by: CLINICAL MEDICAL LABORATORY

## 2024-04-18 PROCEDURE — 3080F DIAST BP >= 90 MM HG: CPT | Mod: CPTII,,, | Performed by: NURSE PRACTITIONER

## 2024-04-18 PROCEDURE — 99214 OFFICE O/P EST MOD 30 MIN: CPT | Mod: ,,, | Performed by: NURSE PRACTITIONER

## 2024-04-18 PROCEDURE — 3008F BODY MASS INDEX DOCD: CPT | Mod: CPTII,,, | Performed by: NURSE PRACTITIONER

## 2024-04-18 PROCEDURE — 3077F SYST BP >= 140 MM HG: CPT | Mod: CPTII,,, | Performed by: NURSE PRACTITIONER

## 2024-04-18 PROCEDURE — 80061 LIPID PANEL: CPT | Mod: ,,, | Performed by: CLINICAL MEDICAL LABORATORY

## 2024-04-18 RX ORDER — OMEPRAZOLE 20 MG/1
20 CAPSULE, DELAYED RELEASE ORAL DAILY
Qty: 90 CAPSULE | Refills: 0 | Status: SHIPPED | OUTPATIENT
Start: 2024-04-18

## 2024-04-18 RX ORDER — OLMESARTAN MEDOXOMIL, AMLODIPINE AND HYDROCHLOROTHIAZIDE TABLET 40/10/25 MG 40; 10; 25 MG/1; MG/1; MG/1
1 TABLET ORAL DAILY
Qty: 90 TABLET | Refills: 0 | Status: SHIPPED | OUTPATIENT
Start: 2024-04-18

## 2024-04-18 RX ORDER — MELOXICAM 15 MG/1
15 TABLET ORAL DAILY PRN
Qty: 90 TABLET | Refills: 0 | Status: SHIPPED | OUTPATIENT
Start: 2024-04-18

## 2024-04-18 NOTE — ASSESSMENT & PLAN NOTE
BP Readings from Last 3 Encounters:   04/18/24 (!) 176/100   06/21/23 (!) 171/96   12/20/22 132/84    The current medical regimen is effective;  continue present plan and medications.  Refill/restart home meds

## 2024-04-18 NOTE — PROGRESS NOTES
DULCE MARIA Dsouza   [facility] [address] [place] [address]  [phone]      PATIENT NAME: Kingsley Bridges  : 1965  DATE: 24  MRN: [id number]      Billing Provider: DULCE MARIA Dsouza  Level of Service:   Patient PCP Information       Provider PCP Type    DULCE MARIA Dsouza General            Reason for Visit / Chief Complaint: Follow-up and Hypertension       Update PCP  Update Chief Complaint         History of Present Illness / Problem Focused Workflow     Kingsley Bridges presents to the clinic with Follow-up and Hypertension     Pt presents for routine follow up with lab and med refills. Overall doing well.      BP appears poorly controlled today. Home meds reviewed patient has been out of his routine medicines for last couple weeks.  He was last seen in clinic in 2023.  Had long discussion with the patient about the importance of routine follow-up and medical management he verbalized understanding and agreement.  Advised to monitor BP at home. Advised on optimal BP readings - SBP < 130 & DBP < 80. Advised to call office for any persistent BP elevation and may have to prescribe or adjust BP med(s).  Recommended DASH diet, stay well hydrated with water daily, eliminate or decrease caffeinated and high calorie drinks, increase physical activity, and lose weight if BMI > 25.0. Written patient education information provided to patient with goals and recommendations to assist with BP management.     Discussed need for low fat/low cholesterol diet, regular exercise, and weight control.   Cardiovascular risk and specific lipid/LDL goals reviewed.        Review of Systems     Review of Systems   Constitutional:  Negative for fatigue and fever.   HENT:  Negative for nasal congestion and sore throat.    Eyes:  Negative for visual disturbance.   Respiratory:  Negative for chest  tightness and shortness of breath.    Cardiovascular:  Negative for chest pain and leg swelling.   Gastrointestinal:  Negative for abdominal pain and change in bowel habit.   Endocrine: Negative for polydipsia, polyphagia and polyuria.   Genitourinary:  Negative for dysuria and hematuria.   Musculoskeletal:  Negative for back pain and leg pain.   Integumentary:  Negative for rash.   Neurological:  Negative for dizziness, syncope, weakness and light-headedness.        Medical / Social / Family History     Past Medical History:   Diagnosis Date    Acid reflux     Hypertension        Past Surgical History:   Procedure Laterality Date    KNEE SURGERY Left     KNEE SURGERY Right        Social History  Mr. Bridges  reports that he has never smoked. He has never used smokeless tobacco. He reports current alcohol use. He reports that he does not use drugs.    Family History  Mr. Bridges's family history includes Diabetes in his father; Heart disease in his brother; No Known Problems in his mother.    Medications and Allergies     Medications  Current Outpatient Medications   Medication Sig Dispense Refill    potassium chloride SA (KLOR-CON M20) 20 MEQ tablet Take 1 tablet (20 mEq total) by mouth once daily. 90 tablet 1    meloxicam (MOBIC) 15 MG tablet Take 1 tablet (15 mg total) by mouth daily as needed for Pain. 90 tablet 0    olmesartan-amLODIPin-hcthiazid 40-10-25 mg Tab Take 1 tablet by mouth once daily. 90 tablet 0    omeprazole (PRILOSEC) 20 MG capsule Take 1 capsule (20 mg total) by mouth once daily. 90 capsule 0     No current facility-administered medications for this visit.       Allergies  Review of patient's allergies indicates:  No Known Allergies    Physical Examination     Vitals:    04/18/24 1105   BP: (!) 176/100   Pulse:    Resp:    Temp:      Physical Exam  Eyes:      Pupils: Pupils are equal, round, and reactive to light.   Cardiovascular:      Rate and Rhythm: Normal rate and regular rhythm.      Heart  sounds: Normal heart sounds. No murmur heard.  Pulmonary:      Breath sounds: Normal breath sounds. No wheezing, rhonchi or rales.   Abdominal:      General: Bowel sounds are normal.   Musculoskeletal:         General: No swelling.      Cervical back: Normal range of motion and neck supple.   Skin:     General: Skin is warm and dry.   Neurological:      Mental Status: He is alert and oriented to person, place, and time.          Lab Results   Component Value Date    WBC 5.67 12/20/2022    HGB 12.9 (L) 12/20/2022    HCT 40.5 12/20/2022    MCV 87.7 12/20/2022     12/20/2022        Sodium   Date Value Ref Range Status   06/21/2023 141 136 - 145 mmol/L Final     Potassium   Date Value Ref Range Status   06/21/2023 3.6 3.5 - 5.1 mmol/L Final     Chloride   Date Value Ref Range Status   06/21/2023 104 98 - 107 mmol/L Final     CO2   Date Value Ref Range Status   06/21/2023 29 21 - 32 mmol/L Final     Glucose   Date Value Ref Range Status   06/21/2023 116 (H) 74 - 106 mg/dL Final     BUN   Date Value Ref Range Status   06/21/2023 12 7 - 18 mg/dL Final     Creatinine   Date Value Ref Range Status   06/21/2023 0.83 0.70 - 1.30 mg/dL Final     Calcium   Date Value Ref Range Status   06/21/2023 9.1 8.5 - 10.1 mg/dL Final     Total Protein   Date Value Ref Range Status   06/21/2023 7.9 6.4 - 8.2 g/dL Final     Albumin   Date Value Ref Range Status   06/21/2023 3.9 3.5 - 5.0 g/dL Final     Bilirubin, Total   Date Value Ref Range Status   06/21/2023 0.8 >0.0 - 1.2 mg/dL Final     Alk Phos   Date Value Ref Range Status   06/21/2023 67 45 - 115 U/L Final     AST   Date Value Ref Range Status   06/21/2023 25 15 - 37 U/L Final     ALT   Date Value Ref Range Status   06/21/2023 46 16 - 61 U/L Final     Anion Gap   Date Value Ref Range Status   06/21/2023 12 7 - 16 mmol/L Final     eGFR   Date Value Ref Range Status   06/21/2023 101 >=60 mL/min/1.73m2 Final      Lab Results   Component Value Date    HGBA1C 5.5 06/21/2023     "  Lab Results   Component Value Date    CHOL 179 06/21/2023    CHOL 177 12/20/2022    CHOL 163 06/29/2022     Lab Results   Component Value Date    HDL 70 (H) 06/21/2023    HDL 64 (H) 12/20/2022    HDL 65 (H) 06/29/2022     Lab Results   Component Value Date    LDLCALC 95 06/21/2023    LDLCALC 100 12/20/2022    LDLCALC 85 06/29/2022     No results found for: "DLDL"  Lab Results   Component Value Date    TRIG 71 06/21/2023    TRIG 66 12/20/2022    TRIG 66 06/29/2022     Lab Results   Component Value Date    CHOLHDL 2.6 06/21/2023    CHOLHDL 2.8 12/20/2022    CHOLHDL 2.5 06/29/2022      Lab Results   Component Value Date    TSH 1.520 06/29/2022        Assessment and Plan (including Health Maintenance)      Problem List  Smart Sets  Document Outside HM   :    Plan:     1. Hypertension, unspecified type  Assessment & Plan:  BP Readings from Last 3 Encounters:   04/18/24 (!) 176/100   06/21/23 (!) 171/96   12/20/22 132/84    The current medical regimen is effective;  continue present plan and medications.  Refill/restart home meds     Orders:  -     CBC Auto Differential; Future; Expected date: 04/18/2024  -     Comprehensive Metabolic Panel; Future; Expected date: 04/18/2024  -     olmesartan-amLODIPin-hcthiazid 40-10-25 mg Tab; Take 1 tablet by mouth once daily.  Dispense: 90 tablet; Refill: 0    2. Hyperlipidemia, unspecified hyperlipidemia type  Assessment & Plan:  Lab Results   Component Value Date    CHOL 179 06/21/2023    CHOL 177 12/20/2022    CHOL 163 06/29/2022     Lab Results   Component Value Date    HDL 70 (H) 06/21/2023    HDL 64 (H) 12/20/2022    HDL 65 (H) 06/29/2022     Lab Results   Component Value Date    LDLCALC 95 06/21/2023    LDLCALC 100 12/20/2022    LDLCALC 85 06/29/2022     Lab Results   Component Value Date    TRIG 71 06/21/2023    TRIG 66 12/20/2022    TRIG 66 06/29/2022       Lab Results   Component Value Date    CHOLHDL 2.6 06/21/2023    CHOLHDL 2.8 12/20/2022    CHOLHDL 2.5 06/29/2022    " The current medical regimen is effective;  continue present plan and medications.      Orders:  -     Lipid Panel; Future; Expected date: 04/18/2024    3. Arthritis  -     meloxicam (MOBIC) 15 MG tablet; Take 1 tablet (15 mg total) by mouth daily as needed for Pain.  Dispense: 90 tablet; Refill: 0    4. Gastroesophageal reflux disease, unspecified whether esophagitis present  -     omeprazole (PRILOSEC) 20 MG capsule; Take 1 capsule (20 mg total) by mouth once daily.  Dispense: 90 capsule; Refill: 0         There are no Patient Instructions on file for this visit.     There are no preventive care reminders to display for this patient.      Health Maintenance Topics with due status: Not Due       Topic Last Completion Date    Colorectal Cancer Screening 11/05/2015    PROSTATE-SPECIFIC ANTIGEN 06/21/2023    TETANUS VACCINE 06/21/2023    Diabetes Urine Screening 06/21/2023    Lipid Panel 06/21/2023    Hemoglobin A1c 06/21/2023       Future Appointments   Date Time Provider Department Center   7/30/2024 10:40 AM Stephani Farah ACNP [facility] [place]            Signature:  DULCE [facility]IA [facility]ouza [facility] [address] [place] [address]  [phone]    Date of encounter: 4/18/24

## 2024-04-18 NOTE — ASSESSMENT & PLAN NOTE
Lab Results   Component Value Date    CHOL 179 06/21/2023    CHOL 177 12/20/2022    CHOL 163 06/29/2022     Lab Results   Component Value Date    HDL 70 (H) 06/21/2023    HDL 64 (H) 12/20/2022    HDL 65 (H) 06/29/2022     Lab Results   Component Value Date    LDLCALC 95 06/21/2023    LDLCALC 100 12/20/2022    LDLCALC 85 06/29/2022     Lab Results   Component Value Date    TRIG 71 06/21/2023    TRIG 66 12/20/2022    TRIG 66 06/29/2022       Lab Results   Component Value Date    CHOLHDL 2.6 06/21/2023    CHOLHDL 2.8 12/20/2022    CHOLHDL 2.5 06/29/2022    The current medical regimen is effective;  continue present plan and medications.

## 2024-07-14 DIAGNOSIS — K21.9 GASTROESOPHAGEAL REFLUX DISEASE, UNSPECIFIED WHETHER ESOPHAGITIS PRESENT: ICD-10-CM

## 2024-07-15 RX ORDER — OMEPRAZOLE 20 MG/1
20 CAPSULE, DELAYED RELEASE ORAL
Qty: 90 CAPSULE | Refills: 0 | Status: SHIPPED | OUTPATIENT
Start: 2024-07-15

## 2024-07-17 DIAGNOSIS — I10 HYPERTENSION, UNSPECIFIED TYPE: ICD-10-CM

## 2024-07-17 RX ORDER — OLMESARTAN MEDOXOMIL, AMLODIPINE AND HYDROCHLOROTHIAZIDE TABLET 40/10/25 MG 40; 10; 25 MG/1; MG/1; MG/1
1 TABLET ORAL
Qty: 90 TABLET | Refills: 0 | Status: SHIPPED | OUTPATIENT
Start: 2024-07-17

## 2024-07-18 DIAGNOSIS — M19.90 ARTHRITIS: ICD-10-CM

## 2024-07-18 RX ORDER — MELOXICAM 15 MG/1
15 TABLET ORAL DAILY PRN
Qty: 30 TABLET | Refills: 0 | Status: SHIPPED | OUTPATIENT
Start: 2024-07-18

## 2024-09-23 ENCOUNTER — OFFICE VISIT (OUTPATIENT)
Dept: FAMILY MEDICINE | Facility: CLINIC | Age: 59
End: 2024-09-23
Payer: COMMERCIAL

## 2024-09-23 VITALS
OXYGEN SATURATION: 96 % | WEIGHT: 281.38 LBS | HEIGHT: 68 IN | RESPIRATION RATE: 19 BRPM | BODY MASS INDEX: 42.64 KG/M2 | TEMPERATURE: 98 F | HEART RATE: 64 BPM | DIASTOLIC BLOOD PRESSURE: 125 MMHG | SYSTOLIC BLOOD PRESSURE: 198 MMHG

## 2024-09-23 DIAGNOSIS — I10 HYPERTENSION, UNSPECIFIED TYPE: Primary | ICD-10-CM

## 2024-09-23 DIAGNOSIS — Z12.5 SCREENING FOR PROSTATE CANCER: ICD-10-CM

## 2024-09-23 DIAGNOSIS — Z13.1 SCREENING FOR DIABETES MELLITUS: ICD-10-CM

## 2024-09-23 DIAGNOSIS — K21.9 GASTROESOPHAGEAL REFLUX DISEASE, UNSPECIFIED WHETHER ESOPHAGITIS PRESENT: ICD-10-CM

## 2024-09-23 DIAGNOSIS — E78.5 HYPERLIPIDEMIA, UNSPECIFIED HYPERLIPIDEMIA TYPE: ICD-10-CM

## 2024-09-23 DIAGNOSIS — Z12.11 SCREEN FOR COLON CANCER: ICD-10-CM

## 2024-09-23 LAB
ALBUMIN SERPL BCP-MCNC: 4 G/DL (ref 3.5–5)
ALBUMIN/GLOB SERPL: 1.1 {RATIO}
ALP SERPL-CCNC: 65 U/L (ref 45–115)
ALT SERPL W P-5'-P-CCNC: 39 U/L (ref 16–61)
ANION GAP SERPL CALCULATED.3IONS-SCNC: 10 MMOL/L (ref 7–16)
AST SERPL W P-5'-P-CCNC: 21 U/L (ref 15–37)
BASOPHILS # BLD AUTO: 0.05 K/UL (ref 0–0.2)
BASOPHILS NFR BLD AUTO: 0.8 % (ref 0–1)
BILIRUB SERPL-MCNC: 0.8 MG/DL (ref ?–1.2)
BUN SERPL-MCNC: 12 MG/DL (ref 7–18)
BUN/CREAT SERPL: 15 (ref 6–20)
CALCIUM SERPL-MCNC: 8.8 MG/DL (ref 8.5–10.1)
CHLORIDE SERPL-SCNC: 104 MMOL/L (ref 98–107)
CHOLEST SERPL-MCNC: 174 MG/DL (ref 0–200)
CHOLEST/HDLC SERPL: 2.6 {RATIO}
CO2 SERPL-SCNC: 29 MMOL/L (ref 21–32)
CREAT SERPL-MCNC: 0.82 MG/DL (ref 0.7–1.3)
CREAT UR-MCNC: 82 MG/DL (ref 39–259)
DIFFERENTIAL METHOD BLD: ABNORMAL
EGFR (NO RACE VARIABLE) (RUSH/TITUS): 101 ML/MIN/1.73M2
EOSINOPHIL # BLD AUTO: 0.25 K/UL (ref 0–0.5)
EOSINOPHIL NFR BLD AUTO: 4 % (ref 1–4)
ERYTHROCYTE [DISTWIDTH] IN BLOOD BY AUTOMATED COUNT: 14.9 % (ref 11.5–14.5)
EST. AVERAGE GLUCOSE BLD GHB EST-MCNC: 114 MG/DL
GLOBULIN SER-MCNC: 3.8 G/DL (ref 2–4)
GLUCOSE SERPL-MCNC: 116 MG/DL (ref 74–106)
HBA1C MFR BLD HPLC: 5.6 % (ref 4.5–6.6)
HCT VFR BLD AUTO: 40.6 % (ref 40–54)
HDLC SERPL-MCNC: 67 MG/DL (ref 40–60)
HGB BLD-MCNC: 12.8 G/DL (ref 13.5–18)
IMM GRANULOCYTES # BLD AUTO: 0.02 K/UL (ref 0–0.04)
IMM GRANULOCYTES NFR BLD: 0.3 % (ref 0–0.4)
LDLC SERPL CALC-MCNC: 93 MG/DL
LDLC/HDLC SERPL: 1.4 {RATIO}
LYMPHOCYTES # BLD AUTO: 2.13 K/UL (ref 1–4.8)
LYMPHOCYTES NFR BLD AUTO: 34.1 % (ref 27–41)
MCH RBC QN AUTO: 27.5 PG (ref 27–31)
MCHC RBC AUTO-ENTMCNC: 31.5 G/DL (ref 32–36)
MCV RBC AUTO: 87.3 FL (ref 80–96)
MICROALBUMIN UR-MCNC: 55.5 MG/DL (ref 0–2.8)
MICROALBUMIN/CREAT RATIO PNL UR: 676.8 MG/G (ref 0–30)
MONOCYTES # BLD AUTO: 0.4 K/UL (ref 0–0.8)
MONOCYTES NFR BLD AUTO: 6.4 % (ref 2–6)
MPC BLD CALC-MCNC: 12.8 FL (ref 9.4–12.4)
NEUTROPHILS # BLD AUTO: 3.39 K/UL (ref 1.8–7.7)
NEUTROPHILS NFR BLD AUTO: 54.4 % (ref 53–65)
NONHDLC SERPL-MCNC: 107 MG/DL
NRBC # BLD AUTO: 0 X10E3/UL
NRBC, AUTO (.00): 0 %
PLATELET # BLD AUTO: 227 K/UL (ref 150–400)
POTASSIUM SERPL-SCNC: 3.6 MMOL/L (ref 3.5–5.1)
PROT SERPL-MCNC: 7.8 G/DL (ref 6.4–8.2)
PSA SERPL-MCNC: 0.31 NG/ML
RBC # BLD AUTO: 4.65 M/UL (ref 4.6–6.2)
SODIUM SERPL-SCNC: 139 MMOL/L (ref 136–145)
TRIGL SERPL-MCNC: 68 MG/DL (ref 35–150)
VLDLC SERPL-MCNC: 14 MG/DL
WBC # BLD AUTO: 6.24 K/UL (ref 4.5–11)

## 2024-09-23 PROCEDURE — 85025 COMPLETE CBC W/AUTO DIFF WBC: CPT | Mod: ,,, | Performed by: CLINICAL MEDICAL LABORATORY

## 2024-09-23 PROCEDURE — 82570 ASSAY OF URINE CREATININE: CPT | Mod: ,,, | Performed by: CLINICAL MEDICAL LABORATORY

## 2024-09-23 PROCEDURE — 3077F SYST BP >= 140 MM HG: CPT | Mod: CPTII,,, | Performed by: NURSE PRACTITIONER

## 2024-09-23 PROCEDURE — 82043 UR ALBUMIN QUANTITATIVE: CPT | Mod: ,,, | Performed by: CLINICAL MEDICAL LABORATORY

## 2024-09-23 PROCEDURE — G0103 PSA SCREENING: HCPCS | Mod: ,,, | Performed by: CLINICAL MEDICAL LABORATORY

## 2024-09-23 PROCEDURE — 99214 OFFICE O/P EST MOD 30 MIN: CPT | Mod: ,,, | Performed by: NURSE PRACTITIONER

## 2024-09-23 PROCEDURE — 1159F MED LIST DOCD IN RCRD: CPT | Mod: CPTII,,, | Performed by: NURSE PRACTITIONER

## 2024-09-23 PROCEDURE — 80053 COMPREHEN METABOLIC PANEL: CPT | Mod: ,,, | Performed by: CLINICAL MEDICAL LABORATORY

## 2024-09-23 PROCEDURE — 3080F DIAST BP >= 90 MM HG: CPT | Mod: CPTII,,, | Performed by: NURSE PRACTITIONER

## 2024-09-23 PROCEDURE — 83036 HEMOGLOBIN GLYCOSYLATED A1C: CPT | Mod: ,,, | Performed by: CLINICAL MEDICAL LABORATORY

## 2024-09-23 PROCEDURE — 80061 LIPID PANEL: CPT | Mod: ,,, | Performed by: CLINICAL MEDICAL LABORATORY

## 2024-09-23 PROCEDURE — 1160F RVW MEDS BY RX/DR IN RCRD: CPT | Mod: CPTII,,, | Performed by: NURSE PRACTITIONER

## 2024-09-23 PROCEDURE — 3008F BODY MASS INDEX DOCD: CPT | Mod: CPTII,,, | Performed by: NURSE PRACTITIONER

## 2024-09-23 RX ORDER — OLMESARTAN MEDOXOMIL, AMLODIPINE AND HYDROCHLOROTHIAZIDE TABLET 40/10/25 MG 40; 10; 25 MG/1; MG/1; MG/1
1 TABLET ORAL DAILY
Qty: 90 TABLET | Refills: 0 | Status: SHIPPED | OUTPATIENT
Start: 2024-09-23

## 2024-09-23 RX ORDER — OMEPRAZOLE 20 MG/1
20 CAPSULE, DELAYED RELEASE ORAL DAILY
Qty: 90 CAPSULE | Refills: 0 | Status: SHIPPED | OUTPATIENT
Start: 2024-09-23

## 2024-09-23 NOTE — ASSESSMENT & PLAN NOTE
Lab Results   Component Value Date    CHOL 165 04/18/2024    CHOL 179 06/21/2023    CHOL 177 12/20/2022     Lab Results   Component Value Date    HDL 67 (H) 04/18/2024    HDL 70 (H) 06/21/2023    HDL 64 (H) 12/20/2022     Lab Results   Component Value Date    LDLCALC 89 04/18/2024    LDLCALC 95 06/21/2023    LDLCALC 100 12/20/2022     Lab Results   Component Value Date    TRIG 45 04/18/2024    TRIG 71 06/21/2023    TRIG 66 12/20/2022       Lab Results   Component Value Date    CHOLHDL 2.5 04/18/2024    CHOLHDL 2.6 06/21/2023    CHOLHDL 2.8 12/20/2022    The current medical regimen is effective;  continue present plan and medications.

## 2024-09-23 NOTE — PROGRESS NOTES
DULCE MARIA Dsouza   [facility] [address] [place] [address]  [phone]      PATIENT NAME: Kingsley Bridges  : 1965  DATE: 24  MRN: [id number]      Billing Provider: DULCE MARIA Dsouza  Level of Service:   Patient PCP Information       Provider PCP Type    DULCE MARIA Dsouza General            Reason for Visit / Chief Complaint: Hypertension and Follow-up       Update PCP  Update Chief Complaint         History of Present Illness / Problem Focused Workflow     Kingsley Bridges presents to the clinic with Hypertension and Follow-up     Pt presents for routine follow up with lab and med refills. Overall doing well.      BP appears  controlled today. Home meds reviewed  The current medical regimen is effective;  continue present plan and medications. Recommended patient to check home readings to monitor and see me for followup as scheduled or sooner as needed.   Discussed sodium restriction, maintaining ideal body weight and regular exercise program as physiologic means to continue to achieve blood pressure control in addition to medication compliance.  Patient was educated that both decongestant and anti-inflammatory medication may raise blood pressure.  Advised to monitor BP at home. Advised on optimal BP readings - SBP < 130 & DBP < 80. Advised to call office for any persistent BP elevation and may have to prescribe or adjust BP med(s).  Recommended DASH diet, stay well hydrated with water daily, eliminate or decrease caffeinated and high calorie drinks, increase physical activity, and lose weight if BMI > 25.0. Written patient education information provided to patient with goals and recommendations to assist with BP management.     Discussed need for low fat/low cholesterol diet, regular exercise, and weight control.   Cardiovascular risk and specific lipid/LDL goals  reviewed.        Review of Systems     Review of Systems   Constitutional:  Negative for fatigue and fever.   HENT:  Negative for nasal congestion and sore throat.    Eyes:  Negative for visual disturbance.   Respiratory:  Negative for chest tightness and shortness of breath.    Cardiovascular:  Negative for chest pain and leg swelling.   Gastrointestinal:  Negative for abdominal pain and change in bowel habit.   Endocrine: Negative for polydipsia, polyphagia and polyuria.   Genitourinary:  Negative for dysuria and hematuria.   Musculoskeletal:  Negative for back pain and leg pain.   Integumentary:  Negative for rash.   Neurological:  Negative for dizziness, syncope, weakness and light-headedness.        Medical / Social / Family History     Past Medical History:   Diagnosis Date    Acid reflux     Hypertension        Past Surgical History:   Procedure Laterality Date    KNEE SURGERY Left     KNEE SURGERY Right        Social History  Mr. Bridges  reports that he has never smoked. He has never used smokeless tobacco. He reports current alcohol use. He reports that he does not use drugs.    Family History  Mr. Bridges's family history includes Diabetes in his father; Heart disease in his brother; No Known Problems in his mother.    Medications and Allergies     Medications  Outpatient Medications Marked as Taking for the 9/23/24 encounter (Office Visit) with Stephani Farah ACNP   Medication Sig Dispense Refill    meloxicam (MOBIC) 15 MG tablet TAKE 1 TABLET BY MOUTH EVERY DAY AS NEEDED FOR PAIN 30 tablet 0    potassium chloride SA (KLOR-CON M20) 20 MEQ tablet Take 1 tablet (20 mEq total) by mouth once daily. 90 tablet 1       Allergies  Review of patient's allergies indicates:  No Known Allergies    Physical Examination     Vitals:    09/23/24 0908   BP: (!) 198/125   Pulse:    Resp:    Temp:      Physical Exam  Eyes:      Pupils: Pupils are equal, round, and reactive to light.   Cardiovascular:      Rate and Rhythm:  Normal rate and regular rhythm.      Heart sounds: Normal heart sounds. No murmur heard.  Pulmonary:      Breath sounds: Normal breath sounds. No wheezing, rhonchi or rales.   Abdominal:      General: Bowel sounds are normal.      Palpations: Abdomen is soft.   Musculoskeletal:         General: No swelling.      Cervical back: Normal range of motion and neck supple.   Skin:     General: Skin is warm and dry.   Neurological:      Mental Status: He is alert and oriented to person, place, and time.          Lab Results   Component Value Date    WBC 5.40 04/18/2024    HGB 11.8 (L) 04/18/2024    HCT 37.6 (L) 04/18/2024    MCV 86.8 04/18/2024     04/18/2024        Sodium   Date Value Ref Range Status   04/18/2024 142 136 - 145 mmol/L Final     Potassium   Date Value Ref Range Status   04/18/2024 4.1 3.5 - 5.1 mmol/L Final     Chloride   Date Value Ref Range Status   04/18/2024 106 98 - 107 mmol/L Final     CO2   Date Value Ref Range Status   04/18/2024 31 21 - 32 mmol/L Final     Glucose   Date Value Ref Range Status   04/18/2024 113 (H) 74 - 106 mg/dL Final     BUN   Date Value Ref Range Status   04/18/2024 10 7 - 18 mg/dL Final     Creatinine   Date Value Ref Range Status   04/18/2024 0.92 0.70 - 1.30 mg/dL Final     Calcium   Date Value Ref Range Status   04/18/2024 9.5 8.5 - 10.1 mg/dL Final     Total Protein   Date Value Ref Range Status   04/18/2024 7.7 6.4 - 8.2 g/dL Final     Albumin   Date Value Ref Range Status   04/18/2024 4.0 3.5 - 5.0 g/dL Final     Bilirubin, Total   Date Value Ref Range Status   04/18/2024 0.9 >0.0 - 1.2 mg/dL Final     Alk Phos   Date Value Ref Range Status   04/18/2024 75 45 - 115 U/L Final     AST   Date Value Ref Range Status   04/18/2024 19 15 - 37 U/L Final     ALT   Date Value Ref Range Status   04/18/2024 48 16 - 61 U/L Final     Anion Gap   Date Value Ref Range Status   04/18/2024 9 7 - 16 mmol/L Final     eGFR   Date Value Ref Range Status   04/18/2024 96 >=60 mL/min/1.73m2  "Final      Lab Results   Component Value Date    HGBA1C 5.5 06/21/2023      Lab Results   Component Value Date    CHOL 165 04/18/2024    CHOL 179 06/21/2023    CHOL 177 12/20/2022     Lab Results   Component Value Date    HDL 67 (H) 04/18/2024    HDL 70 (H) 06/21/2023    HDL 64 (H) 12/20/2022     Lab Results   Component Value Date    LDLCALC 89 04/18/2024    LDLCALC 95 06/21/2023    LDLCALC 100 12/20/2022     No results found for: "DLDL"  Lab Results   Component Value Date    TRIG 45 04/18/2024    TRIG 71 06/21/2023    TRIG 66 12/20/2022     Lab Results   Component Value Date    CHOLHDL 2.5 04/18/2024    CHOLHDL 2.6 06/21/2023    CHOLHDL 2.8 12/20/2022      Lab Results   Component Value Date    TSH 1.520 06/29/2022        Assessment and Plan (including Health Maintenance)      Problem List  Smart Sets  Document Outside HM   :    Plan:     1. Hypertension, unspecified type  Assessment & Plan:  BP Readings from Last 3 Encounters:   09/23/24 (!) 198/125   04/18/24 (!) 176/100   06/21/23 (!) 171/96    The current medical regimen is effective;  continue present plan and medications.      Orders:  -     CBC Auto Differential; Future; Expected date: 09/23/2024  -     Comprehensive Metabolic Panel; Future; Expected date: 09/23/2024  -     Microalbumin/Creatinine Ratio, Urine; Future; Expected date: 09/23/2024  -     olmesartan-amLODIPin-hcthiazid 40-10-25 mg Tab; Take 1 tablet by mouth once daily.  Dispense: 90 tablet; Refill: 0    2. Hyperlipidemia, unspecified hyperlipidemia type  Assessment & Plan:  Lab Results   Component Value Date    CHOL 165 04/18/2024    CHOL 179 06/21/2023    CHOL 177 12/20/2022     Lab Results   Component Value Date    HDL 67 (H) 04/18/2024    HDL 70 (H) 06/21/2023    HDL 64 (H) 12/20/2022     Lab Results   Component Value Date    LDLCALC 89 04/18/2024    LDLCALC 95 06/21/2023    LDLCALC 100 12/20/2022     Lab Results   Component Value Date    TRIG 45 04/18/2024    TRIG 71 06/21/2023    TRIG 66 " 12/20/2022       Lab Results   Component Value Date    CHOLHDL 2.5 04/18/2024    CHOLHDL 2.6 06/21/2023    CHOLHDL 2.8 12/20/2022    The current medical regimen is effective;  continue present plan and medications.      Orders:  -     Lipid Panel; Future; Expected date: 09/23/2024    3. Screening for diabetes mellitus  -     Hemoglobin A1C; Future; Expected date: 09/23/2024    4. Screening for prostate cancer  -     PSA, Screening; Future; Expected date: 09/23/2024    5. Gastroesophageal reflux disease, unspecified whether esophagitis present  -     omeprazole (PRILOSEC) 20 MG capsule; Take 1 capsule (20 mg total) by mouth once daily.  Dispense: 90 capsule; Refill: 0    6. Screen for colon cancer  -     Colonoscopy; Future; Expected date: 09/23/2024         There are no Patient Instructions on file for this visit.     Health Maintenance Due   Topic Date Due    PROSTATE-SPECIFIC ANTIGEN  06/21/2024    Diabetes Urine Screening  06/21/2024    Hemoglobin A1c  06/21/2024         Health Maintenance Topics with due status: Not Due       Topic Last Completion Date    Colorectal Cancer Screening 11/05/2015    TETANUS VACCINE 06/21/2023    Lipid Panel 04/18/2024       Future Appointments   Date Time Provider Department Center   1/27/2025  8:20 AM Stephani Farah ACNP [facility] [place]            Signature:  [facility]ou[facility] [address] [place] [address]  [phone]    Date of encounter: 9/23/24

## 2024-09-23 NOTE — ASSESSMENT & PLAN NOTE
BP Readings from Last 3 Encounters:   09/23/24 (!) 198/125   04/18/24 (!) 176/100   06/21/23 (!) 171/96    The current medical regimen is effective;  continue present plan and medications.

## 2024-12-10 DIAGNOSIS — I10 HYPERTENSION, UNSPECIFIED TYPE: ICD-10-CM

## 2024-12-10 RX ORDER — OLMESARTAN MEDOXOMIL, AMLODIPINE AND HYDROCHLOROTHIAZIDE TABLET 40/10/25 MG 40; 10; 25 MG/1; MG/1; MG/1
1 TABLET ORAL DAILY
Qty: 90 TABLET | Refills: 0 | Status: SHIPPED | OUTPATIENT
Start: 2024-12-10

## 2024-12-10 NOTE — TELEPHONE ENCOUNTER
----- Message from Leigh sent at 12/9/2024  3:50 PM CST -----  Regarding: rx  Who Called: Kingsley Bridges    Refill or New Rx:Refill  RX Name and Strength:olmesartan-amLODIPin-hcthiazid 40-10-25 mg Tab  How is the patient currently taking it? (ex. 1XDay):1xday  Is this a 30 day or 90 day RX:90  Local or Mail Order:local   List of preferred pharmacies on file (remove unneeded): [unfilled]  If different Pharmacy is requested, enter Pharmacy information here including location and phone number: [facility] [address] [address]  Phone: [phone] Fax: [phone]  Hours: Not open 24 hours       Ordering Provider:louisa       Preferred Method of Contact: Phone Call  Patient's Preferred Phone Number on File: [phone]   Best Call Back Number, if different:  Additional Information:

## 2025-01-27 ENCOUNTER — OFFICE VISIT (OUTPATIENT)
Dept: FAMILY MEDICINE | Facility: CLINIC | Age: 60
End: 2025-01-27
Payer: COMMERCIAL

## 2025-01-27 VITALS
DIASTOLIC BLOOD PRESSURE: 95 MMHG | OXYGEN SATURATION: 95 % | BODY MASS INDEX: 42.92 KG/M2 | SYSTOLIC BLOOD PRESSURE: 158 MMHG | HEART RATE: 76 BPM | RESPIRATION RATE: 18 BRPM | TEMPERATURE: 99 F | HEIGHT: 68 IN | WEIGHT: 283.19 LBS

## 2025-01-27 DIAGNOSIS — I10 PRIMARY HYPERTENSION: Primary | ICD-10-CM

## 2025-01-27 DIAGNOSIS — K21.9 GASTROESOPHAGEAL REFLUX DISEASE, UNSPECIFIED WHETHER ESOPHAGITIS PRESENT: ICD-10-CM

## 2025-01-27 DIAGNOSIS — E78.5 HYPERLIPIDEMIA, UNSPECIFIED HYPERLIPIDEMIA TYPE: ICD-10-CM

## 2025-01-27 DIAGNOSIS — M19.90 ARTHRITIS: ICD-10-CM

## 2025-01-27 LAB
ALBUMIN SERPL BCP-MCNC: 4.2 G/DL (ref 3.4–4.8)
ALBUMIN/GLOB SERPL: 1.4 {RATIO}
ALP SERPL-CCNC: 54 U/L (ref 40–150)
ALT SERPL W P-5'-P-CCNC: 28 U/L
ANION GAP SERPL CALCULATED.3IONS-SCNC: 15 MMOL/L (ref 7–16)
AST SERPL W P-5'-P-CCNC: 43 U/L (ref 5–34)
BILIRUB SERPL-MCNC: 0.9 MG/DL
BUN SERPL-MCNC: 13 MG/DL (ref 8–26)
BUN/CREAT SERPL: 15 (ref 6–20)
CALCIUM SERPL-MCNC: 9.6 MG/DL (ref 8.8–10)
CHLORIDE SERPL-SCNC: 103 MMOL/L (ref 98–107)
CHOLEST SERPL-MCNC: 169 MG/DL
CHOLEST/HDLC SERPL: 3.1 {RATIO}
CO2 SERPL-SCNC: 26 MMOL/L (ref 23–31)
CREAT SERPL-MCNC: 0.88 MG/DL (ref 0.72–1.25)
EGFR (NO RACE VARIABLE) (RUSH/TITUS): 98 ML/MIN/1.73M2
GLOBULIN SER-MCNC: 3.1 G/DL (ref 2–4)
GLUCOSE SERPL-MCNC: 127 MG/DL (ref 82–115)
HDLC SERPL-MCNC: 54 MG/DL (ref 35–60)
LDLC SERPL CALC-MCNC: 102 MG/DL
LDLC/HDLC SERPL: 1.9 {RATIO}
NONHDLC SERPL-MCNC: 115 MG/DL
POTASSIUM SERPL-SCNC: 3.8 MMOL/L (ref 3.5–5.1)
PROT SERPL-MCNC: 7.3 G/DL (ref 5.8–7.6)
SODIUM SERPL-SCNC: 140 MMOL/L (ref 136–145)
TRIGL SERPL-MCNC: 66 MG/DL (ref 34–140)
VLDLC SERPL-MCNC: 13 MG/DL

## 2025-01-27 PROCEDURE — 3077F SYST BP >= 140 MM HG: CPT | Mod: CPTII,,, | Performed by: NURSE PRACTITIONER

## 2025-01-27 PROCEDURE — 1160F RVW MEDS BY RX/DR IN RCRD: CPT | Mod: CPTII,,, | Performed by: NURSE PRACTITIONER

## 2025-01-27 PROCEDURE — 1159F MED LIST DOCD IN RCRD: CPT | Mod: CPTII,,, | Performed by: NURSE PRACTITIONER

## 2025-01-27 PROCEDURE — 3080F DIAST BP >= 90 MM HG: CPT | Mod: CPTII,,, | Performed by: NURSE PRACTITIONER

## 2025-01-27 PROCEDURE — 80061 LIPID PANEL: CPT | Mod: ,,, | Performed by: CLINICAL MEDICAL LABORATORY

## 2025-01-27 PROCEDURE — 3008F BODY MASS INDEX DOCD: CPT | Mod: CPTII,,, | Performed by: NURSE PRACTITIONER

## 2025-01-27 PROCEDURE — 99213 OFFICE O/P EST LOW 20 MIN: CPT | Mod: ,,, | Performed by: NURSE PRACTITIONER

## 2025-01-27 PROCEDURE — 80053 COMPREHEN METABOLIC PANEL: CPT | Mod: ,,, | Performed by: CLINICAL MEDICAL LABORATORY

## 2025-01-27 RX ORDER — OMEPRAZOLE 20 MG/1
20 CAPSULE, DELAYED RELEASE ORAL DAILY
Qty: 90 CAPSULE | Refills: 0 | Status: SHIPPED | OUTPATIENT
Start: 2025-01-27

## 2025-01-27 RX ORDER — POTASSIUM CHLORIDE 20 MEQ/1
20 TABLET, EXTENDED RELEASE ORAL DAILY
Qty: 90 TABLET | Refills: 1 | Status: SHIPPED | OUTPATIENT
Start: 2025-01-27

## 2025-01-27 RX ORDER — OLMESARTAN MEDOXOMIL, AMLODIPINE AND HYDROCHLOROTHIAZIDE TABLET 40/10/25 MG 40; 10; 25 MG/1; MG/1; MG/1
1 TABLET ORAL DAILY
Qty: 90 TABLET | Refills: 0 | Status: SHIPPED | OUTPATIENT
Start: 2025-01-27

## 2025-01-27 RX ORDER — MELOXICAM 15 MG/1
15 TABLET ORAL DAILY PRN
Qty: 90 TABLET | Refills: 0 | Status: SHIPPED | OUTPATIENT
Start: 2025-01-27

## 2025-01-27 NOTE — ASSESSMENT & PLAN NOTE
Lab Results   Component Value Date    CHOL 174 09/23/2024    CHOL 165 04/18/2024    CHOL 179 06/21/2023     Lab Results   Component Value Date    HDL 67 (H) 09/23/2024    HDL 67 (H) 04/18/2024    HDL 70 (H) 06/21/2023     Lab Results   Component Value Date    LDLCALC 93 09/23/2024    LDLCALC 89 04/18/2024    LDLCALC 95 06/21/2023     Lab Results   Component Value Date    TRIG 68 09/23/2024    TRIG 45 04/18/2024    TRIG 71 06/21/2023       Lab Results   Component Value Date    CHOLHDL 2.6 09/23/2024    CHOLHDL 2.5 04/18/2024    CHOLHDL 2.6 06/21/2023    The current medical regimen is effective;  continue present plan and medications.

## 2025-01-27 NOTE — PROGRESS NOTES
DULCE MARIA Dsouza   [facility] [address] [place] [address]  [phone]      PATIENT NAME: Kingsley Bridges  : 1965  DATE: 25  MRN: [id number]      Billing Provider: DULCE MARIA Dsouza  Level of Service:   Patient PCP Information       Provider PCP Type    DULCE MARIA Dsouza General            Reason for Visit / Chief Complaint: Follow-up and Hypertension       Update PCP  Update Chief Complaint         History of Present Illness / Problem Focused Workflow     Kingsley Bridges presents to the clinic with Follow-up and Hypertension     Pt presents for routine follow up with lab and med refills. Overall doing well.      BP appears poorly controlled today. Home meds reviewed    Advised to monitor BP at home. Advised on optimal BP readings - SBP < 130 & DBP < 80. Advised to call office for any persistent BP elevation and may have to prescribe or adjust BP med(s).  Recommended DASH diet, stay well hydrated with water daily, eliminate or decrease caffeinated and high calorie drinks, increase physical activity, and lose weight if BMI > 25.0. Written patient education information provided to patient with goals and recommendations to assist with BP management.     Discussed need for low fat/low cholesterol diet, regular exercise, and weight control.   Cardiovascular risk and specific lipid/LDL goals reviewed.    Health Maintenance:  Colonoscopy:  recommend follow up 3-5 yrs for colonoscopy/3yrs for cologuard  Flu:  recommend annual vaccine  Pna:  recommend for over 65+ yrs and those with high risk including DM  Shingles: recommend for anyone 50+yrs  Tetanus: recommended every 10 years unless there is an injruy  Eye exam:  recommend annual evaluation         Review of Systems     Review of Systems   Constitutional:  Negative for fatigue and fever.   HENT:  Negative for nasal congestion and sore  throat.    Eyes:  Negative for visual disturbance.   Respiratory:  Negative for chest tightness and shortness of breath.    Cardiovascular:  Negative for chest pain and leg swelling.   Gastrointestinal:  Negative for abdominal pain and change in bowel habit.   Endocrine: Negative for polydipsia, polyphagia and polyuria.   Genitourinary:  Negative for dysuria and hematuria.   Musculoskeletal:  Negative for back pain and leg pain.   Integumentary:  Negative for rash.   Neurological:  Negative for dizziness, syncope, weakness and light-headedness.        Medical / Social / Family History     Past Medical History:   Diagnosis Date    Acid reflux     Hypertension        Past Surgical History:   Procedure Laterality Date    KNEE SURGERY Left     KNEE SURGERY Right        Social History  Mr. Bridges  reports that he has never smoked. He has never used smokeless tobacco. He reports current alcohol use. He reports that he does not use drugs.    Family History  Mr. Bridges's family history includes Diabetes in his father; Heart disease in his brother; No Known Problems in his mother.    Medications and Allergies     Medications  Outpatient Medications Marked as Taking for the 1/27/25 encounter (Office Visit) with Stephani Farah ACNP   Medication Sig Dispense Refill    [DISCONTINUED] meloxicam (MOBIC) 15 MG tablet TAKE 1 TABLET BY MOUTH EVERY DAY AS NEEDED FOR PAIN 30 tablet 0    [DISCONTINUED] olmesartan-amLODIPin-hcthiazid 40-10-25 mg Tab Take 1 tablet by mouth once daily. 90 tablet 0    [DISCONTINUED] omeprazole (PRILOSEC) 20 MG capsule Take 1 capsule (20 mg total) by mouth once daily. 90 capsule 0    [DISCONTINUED] potassium chloride SA (KLOR-CON M20) 20 MEQ tablet Take 1 tablet (20 mEq total) by mouth once daily. 90 tablet 1       Allergies  Review of patient's allergies indicates:  No Known Allergies    Physical Examination     Vitals:    01/27/25 0828   BP: (!) 158/95   Pulse: 76   Resp: 18   Temp: 98.5 °F (36.9 °C)      Physical Exam  Eyes:      Pupils: Pupils are equal, round, and reactive to light.   Cardiovascular:      Rate and Rhythm: Normal rate and regular rhythm.      Heart sounds: Normal heart sounds. No murmur heard.  Pulmonary:      Breath sounds: Normal breath sounds. No wheezing, rhonchi or rales.   Abdominal:      General: Bowel sounds are normal.      Palpations: Abdomen is soft.   Musculoskeletal:         General: No swelling.      Cervical back: Normal range of motion and neck supple.   Skin:     General: Skin is warm and dry.   Neurological:      Mental Status: He is alert and oriented to person, place, and time.          Lab Results   Component Value Date    WBC 6.24 09/23/2024    HGB 12.8 (L) 09/23/2024    HCT 40.6 09/23/2024    MCV 87.3 09/23/2024     09/23/2024        Sodium   Date Value Ref Range Status   09/23/2024 139 136 - 145 mmol/L Final     Potassium   Date Value Ref Range Status   09/23/2024 3.6 3.5 - 5.1 mmol/L Final     Chloride   Date Value Ref Range Status   09/23/2024 104 98 - 107 mmol/L Final     CO2   Date Value Ref Range Status   09/23/2024 29 21 - 32 mmol/L Final     Glucose   Date Value Ref Range Status   09/23/2024 116 (H) 74 - 106 mg/dL Final     BUN   Date Value Ref Range Status   09/23/2024 12 7 - 18 mg/dL Final     Creatinine   Date Value Ref Range Status   09/23/2024 0.82 0.70 - 1.30 mg/dL Final     Calcium   Date Value Ref Range Status   09/23/2024 8.8 8.5 - 10.1 mg/dL Final     Total Protein   Date Value Ref Range Status   09/23/2024 7.8 6.4 - 8.2 g/dL Final     Albumin   Date Value Ref Range Status   09/23/2024 4.0 3.5 - 5.0 g/dL Final     Bilirubin, Total   Date Value Ref Range Status   09/23/2024 0.8 >0.0 - 1.2 mg/dL Final     Alk Phos   Date Value Ref Range Status   09/23/2024 65 45 - 115 U/L Final     AST   Date Value Ref Range Status   09/23/2024 21 15 - 37 U/L Final     ALT   Date Value Ref Range Status   09/23/2024 39 16 - 61 U/L Final     Anion Gap   Date Value Ref  "Range Status   09/23/2024 10 7 - 16 mmol/L Final     eGFR   Date Value Ref Range Status   09/23/2024 101 >=60 mL/min/1.73m2 Final      Lab Results   Component Value Date    HGBA1C 5.6 09/23/2024      Lab Results   Component Value Date    CHOL 174 09/23/2024    CHOL 165 04/18/2024    CHOL 179 06/21/2023     Lab Results   Component Value Date    HDL 67 (H) 09/23/2024    HDL 67 (H) 04/18/2024    HDL 70 (H) 06/21/2023     Lab Results   Component Value Date    LDLCALC 93 09/23/2024    LDLCALC 89 04/18/2024    LDLCALC 95 06/21/2023     No results found for: "DLDL"  Lab Results   Component Value Date    TRIG 68 09/23/2024    TRIG 45 04/18/2024    TRIG 71 06/21/2023     Lab Results   Component Value Date    CHOLHDL 2.6 09/23/2024    CHOLHDL 2.5 04/18/2024    CHOLHDL 2.6 06/21/2023      Lab Results   Component Value Date    TSH 1.520 06/29/2022        Assessment and Plan (including Health Maintenance)      Problem List  Smart Sets  Document Outside HM   :    Plan:     1. Primary hypertension  Assessment & Plan:  BP Readings from Last 3 Encounters:   01/27/25 (!) 158/95   09/23/24 (!) 198/125   04/18/24 (!) 176/100    Goal less 130/80  Follow up 2 weeks for bp check  Consider medication adjustment  Htn edu given to pt     Orders:  -     Comprehensive Metabolic Panel; Future; Expected date: 01/27/2025  -     olmesartan-amLODIPin-hcthiazid 40-10-25 mg Tab; Take 1 tablet by mouth once daily.  Dispense: 90 tablet; Refill: 0  -     potassium chloride SA (KLOR-CON M20) 20 MEQ tablet; Take 1 tablet (20 mEq total) by mouth once daily.  Dispense: 90 tablet; Refill: 1    2. Hyperlipidemia, unspecified hyperlipidemia type  Assessment & Plan:  Lab Results   Component Value Date    CHOL 174 09/23/2024    CHOL 165 04/18/2024    CHOL 179 06/21/2023     Lab Results   Component Value Date    HDL 67 (H) 09/23/2024    HDL 67 (H) 04/18/2024    HDL 70 (H) 06/21/2023     Lab Results   Component Value Date    LDLCALC 93 09/23/2024    LDLCALC 89 " 04/18/2024    LDLCALC 95 06/21/2023     Lab Results   Component Value Date    TRIG 68 09/23/2024    TRIG 45 04/18/2024    TRIG 71 06/21/2023       Lab Results   Component Value Date    CHOLHDL 2.6 09/23/2024    CHOLHDL 2.5 04/18/2024    CHOLHDL 2.6 06/21/2023    The current medical regimen is effective;  continue present plan and medications.      Orders:  -     Lipid Panel; Future; Expected date: 01/27/2025    3. Arthritis  -     meloxicam (MOBIC) 15 MG tablet; Take 1 tablet (15 mg total) by mouth daily as needed for Pain.  Dispense: 90 tablet; Refill: 0    4. Gastroesophageal reflux disease, unspecified whether esophagitis present  Assessment & Plan:  The current medical regimen is effective;  continue present plan and medications.      Orders:  -     omeprazole (PRILOSEC) 20 MG capsule; Take 1 capsule (20 mg total) by mouth once daily.  Dispense: 90 capsule; Refill: 0         There are no Patient Instructions on file for this visit.     Health Maintenance Due   Topic Date Due    Pneumococcal Vaccines (Age 50+) (1 of 1 - PCV) Never done    RSV Vaccine (Age 60+ and Pregnant patients) (1 - Risk 60-74 years 1-dose series) Never done         Health Maintenance Topics with due status: Not Due       Topic Last Completion Date    Colorectal Cancer Screening 11/05/2015    TETANUS VACCINE 06/21/2023    PROSTATE-SPECIFIC ANTIGEN 09/23/2024    Diabetes Urine Screening 09/23/2024    Lipid Panel 09/23/2024    Hemoglobin A1c 09/23/2024       Future Appointments   Date Time Provider Department Center   4/25/2025  8:00 AM Stephani Farah ACNP [facility]            Signature:  DULCE MARIA Dsouza [facility] [address] [place] [address]  [phone]    Date of encounter: 1/27/25

## 2025-01-27 NOTE — ASSESSMENT & PLAN NOTE
BP Readings from Last 3 Encounters:   01/27/25 (!) 158/95   09/23/24 (!) 198/125   04/18/24 (!) 176/100    Goal less 130/80  Follow up 2 weeks for bp check  Consider medication adjustment  Htn edu given to pt

## 2025-05-02 ENCOUNTER — OFFICE VISIT (OUTPATIENT)
Dept: FAMILY MEDICINE | Facility: CLINIC | Age: 60
End: 2025-05-02
Payer: COMMERCIAL

## 2025-05-02 VITALS
HEIGHT: 68 IN | WEIGHT: 285.63 LBS | TEMPERATURE: 98 F | OXYGEN SATURATION: 96 % | HEART RATE: 63 BPM | BODY MASS INDEX: 43.29 KG/M2 | SYSTOLIC BLOOD PRESSURE: 169 MMHG | RESPIRATION RATE: 18 BRPM | DIASTOLIC BLOOD PRESSURE: 94 MMHG

## 2025-05-02 DIAGNOSIS — M19.90 ARTHRITIS: ICD-10-CM

## 2025-05-02 DIAGNOSIS — R73.01 IFG (IMPAIRED FASTING GLUCOSE): ICD-10-CM

## 2025-05-02 DIAGNOSIS — I10 PRIMARY HYPERTENSION: Primary | ICD-10-CM

## 2025-05-02 DIAGNOSIS — K21.9 GASTROESOPHAGEAL REFLUX DISEASE, UNSPECIFIED WHETHER ESOPHAGITIS PRESENT: ICD-10-CM

## 2025-05-02 PROBLEM — E78.5 HYPERLIPIDEMIA: Status: RESOLVED | Noted: 2021-06-22 | Resolved: 2025-05-02

## 2025-05-02 PROBLEM — Z12.5 SCREENING FOR PROSTATE CANCER: Status: RESOLVED | Noted: 2022-06-29 | Resolved: 2025-05-02

## 2025-05-02 LAB
ALBUMIN SERPL BCP-MCNC: 3.8 G/DL (ref 3.4–4.8)
ALBUMIN/GLOB SERPL: 1 {RATIO}
ALP SERPL-CCNC: 70 U/L (ref 40–150)
ALT SERPL W P-5'-P-CCNC: 38 U/L
ANION GAP SERPL CALCULATED.3IONS-SCNC: 12 MMOL/L (ref 7–16)
AST SERPL W P-5'-P-CCNC: 27 U/L (ref 11–45)
BILIRUB SERPL-MCNC: 0.7 MG/DL
BUN SERPL-MCNC: 12 MG/DL (ref 8–26)
BUN/CREAT SERPL: 16 (ref 6–20)
CALCIUM SERPL-MCNC: 9.3 MG/DL (ref 8.8–10)
CHLORIDE SERPL-SCNC: 103 MMOL/L (ref 98–107)
CHOLEST SERPL-MCNC: 174 MG/DL
CHOLEST/HDLC SERPL: 3.1 {RATIO}
CO2 SERPL-SCNC: 29 MMOL/L (ref 23–31)
CREAT SERPL-MCNC: 0.77 MG/DL (ref 0.72–1.25)
CREAT UR-MCNC: 76 MG/DL (ref 23–375)
EGFR (NO RACE VARIABLE) (RUSH/TITUS): 102 ML/MIN/1.73M2
EST. AVERAGE GLUCOSE BLD GHB EST-MCNC: 111 MG/DL
GLOBULIN SER-MCNC: 3.9 G/DL (ref 2–4)
GLUCOSE SERPL-MCNC: 122 MG/DL (ref 82–115)
HBA1C MFR BLD HPLC: 5.5 %
HDLC SERPL-MCNC: 57 MG/DL (ref 35–60)
LDLC SERPL CALC-MCNC: 103 MG/DL
LDLC/HDLC SERPL: 1.8 {RATIO}
MICROALBUMIN UR-MCNC: 32.9 MG/DL
MICROALBUMIN/CREAT RATIO PNL UR: 432.9 MG/G (ref 0–30)
NONHDLC SERPL-MCNC: 117 MG/DL
POTASSIUM SERPL-SCNC: 3.9 MMOL/L (ref 3.5–5.1)
PROT SERPL-MCNC: 7.7 G/DL (ref 5.8–7.6)
SODIUM SERPL-SCNC: 140 MMOL/L (ref 136–145)
TRIGL SERPL-MCNC: 71 MG/DL (ref 34–140)
VLDLC SERPL-MCNC: 14 MG/DL

## 2025-05-02 PROCEDURE — 82570 ASSAY OF URINE CREATININE: CPT | Mod: ,,, | Performed by: CLINICAL MEDICAL LABORATORY

## 2025-05-02 PROCEDURE — 80053 COMPREHEN METABOLIC PANEL: CPT | Mod: ,,, | Performed by: CLINICAL MEDICAL LABORATORY

## 2025-05-02 PROCEDURE — 82043 UR ALBUMIN QUANTITATIVE: CPT | Mod: ,,, | Performed by: CLINICAL MEDICAL LABORATORY

## 2025-05-02 PROCEDURE — 80061 LIPID PANEL: CPT | Mod: ,,, | Performed by: CLINICAL MEDICAL LABORATORY

## 2025-05-02 PROCEDURE — 83036 HEMOGLOBIN GLYCOSYLATED A1C: CPT | Mod: ,,, | Performed by: CLINICAL MEDICAL LABORATORY

## 2025-05-02 RX ORDER — OLMESARTAN MEDOXOMIL, AMLODIPINE AND HYDROCHLOROTHIAZIDE TABLET 40/10/25 MG 40; 10; 25 MG/1; MG/1; MG/1
1 TABLET ORAL DAILY
Qty: 90 TABLET | Refills: 1 | Status: SHIPPED | OUTPATIENT
Start: 2025-05-02

## 2025-05-02 RX ORDER — OMEPRAZOLE 20 MG/1
20 CAPSULE, DELAYED RELEASE ORAL DAILY
Qty: 90 CAPSULE | Refills: 1 | Status: SHIPPED | OUTPATIENT
Start: 2025-05-02 | End: 2025-05-02

## 2025-05-02 RX ORDER — OLMESARTAN MEDOXOMIL, AMLODIPINE AND HYDROCHLOROTHIAZIDE TABLET 40/10/25 MG 40; 10; 25 MG/1; MG/1; MG/1
1 TABLET ORAL DAILY
Qty: 90 TABLET | Refills: 1 | Status: SHIPPED | OUTPATIENT
Start: 2025-05-02 | End: 2025-05-02

## 2025-05-02 RX ORDER — MELOXICAM 15 MG/1
15 TABLET ORAL DAILY PRN
Qty: 90 TABLET | Refills: 1 | Status: SHIPPED | OUTPATIENT
Start: 2025-05-02 | End: 2025-05-02

## 2025-05-02 RX ORDER — OMEPRAZOLE 20 MG/1
20 CAPSULE, DELAYED RELEASE ORAL DAILY
Qty: 90 CAPSULE | Refills: 1 | Status: SHIPPED | OUTPATIENT
Start: 2025-05-02

## 2025-05-02 RX ORDER — POTASSIUM CHLORIDE 20 MEQ/1
20 TABLET, EXTENDED RELEASE ORAL DAILY
Qty: 90 TABLET | Refills: 1 | Status: SHIPPED | OUTPATIENT
Start: 2025-05-02 | End: 2025-05-02

## 2025-05-02 RX ORDER — POTASSIUM CHLORIDE 20 MEQ/1
20 TABLET, EXTENDED RELEASE ORAL DAILY
Qty: 90 TABLET | Refills: 1 | Status: SHIPPED | OUTPATIENT
Start: 2025-05-02

## 2025-05-02 RX ORDER — MELOXICAM 15 MG/1
15 TABLET ORAL DAILY PRN
Qty: 90 TABLET | Refills: 1 | Status: SHIPPED | OUTPATIENT
Start: 2025-05-02

## 2025-05-02 NOTE — PROGRESS NOTES
DULCE MARIA Dsouza   [facility] [address] [place] [address]  [phone]      PATIENT NAME: Kingsley Bridges  : 1965  DATE: 25  MRN: [id number]      Billing Provider: DULCE MARIA Dsouza  Level of Service:   Patient PCP Information       Provider PCP Type    DULCE MARIA Dsouza General            Reason for Visit / Chief Complaint: Follow-up, Hypertension, and Hyperlipidemia (Pt has been out of BP meds x 2 days)       Update PCP  Update Chief Complaint         History of Present Illness / Problem Focused Workflow     Kingsley Bridges presents to the clinic with Follow-up, Hypertension, and Hyperlipidemia (Pt has been out of BP meds x 2 days)     Pt presents for routine follow up with lab and med refills. Overall doing well.      BP appears not at goal today. Home meds reviewed    Advised to monitor BP at home. Advised on optimal BP readings - SBP < 130 & DBP < 80. Advised to call office for any persistent BP elevation and may have to prescribe or adjust BP med(s).  Recommended DASH diet, stay well hydrated with water daily, eliminate or decrease caffeinated and high calorie drinks, increase physical activity, and lose weight if BMI > 25.0. Written patient education information provided to patient with goals and recommendations to assist with BP management.     Discussed need for low fat/low cholesterol diet, regular exercise, and weight control.   Cardiovascular risk and specific lipid/LDL goals reviewed.          Review of Systems     Review of Systems   Constitutional:  Negative for fatigue and fever.   HENT:  Negative for nasal congestion and sore throat.    Eyes:  Negative for visual disturbance.   Respiratory:  Negative for chest tightness and shortness of breath.    Cardiovascular:  Negative for chest pain and leg swelling.   Gastrointestinal:  Negative for abdominal pain and change in  bowel habit.   Endocrine: Negative for polydipsia, polyphagia and polyuria.   Genitourinary:  Negative for dysuria and hematuria.   Musculoskeletal:  Negative for back pain and leg pain.   Integumentary:  Negative for rash.   Neurological:  Negative for dizziness, syncope, weakness and light-headedness.        Medical / Social / Family History     Past Medical History:   Diagnosis Date    Acid reflux     Hypertension        Past Surgical History:   Procedure Laterality Date    KNEE SURGERY Left     KNEE SURGERY Right        Social History  Mr. Bridges  reports that he has never smoked. He has never used smokeless tobacco. He reports current alcohol use. He reports that he does not use drugs.    Family History  Mr. Bridges's family history includes Diabetes in his father; Heart disease in his brother; No Known Problems in his mother.    Medications and Allergies     Medications  Outpatient Medications Marked as Taking for the 5/2/25 encounter (Office Visit) with Stephani Farah ACNP   Medication Sig Dispense Refill    [DISCONTINUED] meloxicam (MOBIC) 15 MG tablet Take 1 tablet (15 mg total) by mouth daily as needed for Pain. 90 tablet 0    [DISCONTINUED] olmesartan-amLODIPin-hcthiazid 40-10-25 mg Tab Take 1 tablet by mouth once daily. 90 tablet 0    [DISCONTINUED] omeprazole (PRILOSEC) 20 MG capsule Take 1 capsule (20 mg total) by mouth once daily. 90 capsule 0    [DISCONTINUED] potassium chloride SA (KLOR-CON M20) 20 MEQ tablet Take 1 tablet (20 mEq total) by mouth once daily. 90 tablet 1       Allergies  Review of patient's allergies indicates:  No Known Allergies    Physical Examination     Vitals:    05/02/25 0919   BP: (!) 169/94   Pulse:    Resp:    Temp:      Physical Exam  Eyes:      Pupils: Pupils are equal, round, and reactive to light.   Cardiovascular:      Rate and Rhythm: Normal rate and regular rhythm.      Heart sounds: Normal heart sounds. No murmur heard.  Pulmonary:      Breath sounds: Normal  breath sounds. No wheezing, rhonchi or rales.   Abdominal:      General: Bowel sounds are normal.      Palpations: Abdomen is soft.   Musculoskeletal:         General: No swelling.      Cervical back: Normal range of motion and neck supple.   Skin:     General: Skin is warm and dry.   Neurological:      Mental Status: He is alert and oriented to person, place, and time.          Lab Results   Component Value Date    WBC 6.24 09/23/2024    HGB 12.8 (L) 09/23/2024    HCT 40.6 09/23/2024    MCV 87.3 09/23/2024     09/23/2024        Sodium   Date Value Ref Range Status   01/27/2025 140 136 - 145 mmol/L Final     Potassium   Date Value Ref Range Status   01/27/2025 3.8 3.5 - 5.1 mmol/L Final     Chloride   Date Value Ref Range Status   01/27/2025 103 98 - 107 mmol/L Final     CO2   Date Value Ref Range Status   01/27/2025 26 23 - 31 mmol/L Final     Glucose   Date Value Ref Range Status   01/27/2025 127 (H) 82 - 115 mg/dL Final     BUN   Date Value Ref Range Status   01/27/2025 13 8 - 26 mg/dL Final     Creatinine   Date Value Ref Range Status   01/27/2025 0.88 0.72 - 1.25 mg/dL Final     Calcium   Date Value Ref Range Status   01/27/2025 9.6 8.8 - 10.0 mg/dL Final     Total Protein   Date Value Ref Range Status   01/27/2025 7.3 5.8 - 7.6 g/dL Final     Albumin   Date Value Ref Range Status   01/27/2025 4.2 3.4 - 4.8 g/dL Final     Bilirubin, Total   Date Value Ref Range Status   01/27/2025 0.9 <=1.5 mg/dL Final     Alk Phos   Date Value Ref Range Status   01/27/2025 54 40 - 150 U/L Final     AST   Date Value Ref Range Status   01/27/2025 43 (H) 5 - 34 U/L Final     ALT   Date Value Ref Range Status   01/27/2025 28 <=55 U/L Final     Anion Gap   Date Value Ref Range Status   01/27/2025 15 7 - 16 mmol/L Final     eGFR   Date Value Ref Range Status   01/27/2025 98 >=60 mL/min/1.73m2 Final     Comment:     Estimated GFR calculated using the CKD-EPI creatinine (2021) equation.      Lab Results   Component Value Date  "   HGBA1C 5.6 09/23/2024      Lab Results   Component Value Date    CHOL 169 01/27/2025    CHOL 174 09/23/2024    CHOL 165 04/18/2024     Lab Results   Component Value Date    HDL 54 01/27/2025    HDL 67 (H) 09/23/2024    HDL 67 (H) 04/18/2024     Lab Results   Component Value Date    LDLCALC 102 01/27/2025    LDLCALC 93 09/23/2024    LDLCALC 89 04/18/2024     No results found for: "DLDL"  Lab Results   Component Value Date    TRIG 66 01/27/2025    TRIG 68 09/23/2024    TRIG 45 04/18/2024     Lab Results   Component Value Date    CHOLHDL 3.1 01/27/2025    CHOLHDL 2.6 09/23/2024    CHOLHDL 2.5 04/18/2024      Lab Results   Component Value Date    TSH 1.520 06/29/2022        Assessment and Plan (including Health Maintenance)      Problem List  Smart Sets  Document Outside HM   :    Plan:     1. Primary hypertension  Assessment & Plan:  BP Readings from Last 3 Encounters:   05/02/25 (!) 169/94   01/27/25 (!) 158/95   09/23/24 (!) 198/125    No meds for several days  Encouraged pt to call for refills and not run out of medications  Encouraged home monitoring  Pt to follow up in 2 week bp check  Goal less 130/80      Orders:  -     Comprehensive Metabolic Panel; Future; Expected date: 05/02/2025  -     Lipid Panel; Future; Expected date: 05/02/2025  -     Microalbumin/Creatinine Ratio, Urine  -     Discontinue: olmesartan-amLODIPin-hcthiazid 40-10-25 mg Tab; Take 1 tablet by mouth once daily.  Dispense: 90 tablet; Refill: 1  -     Discontinue: potassium chloride SA (KLOR-CON M20) 20 MEQ tablet; Take 1 tablet (20 mEq total) by mouth once daily.  Dispense: 90 tablet; Refill: 1  -     olmesartan-amLODIPin-hcthiazid 40-10-25 mg Tab; Take 1 tablet by mouth once daily.  Dispense: 90 tablet; Refill: 1  -     potassium chloride SA (KLOR-CON M20) 20 MEQ tablet; Take 1 tablet (20 mEq total) by mouth once daily.  Dispense: 90 tablet; Refill: 1    2. IFG (impaired fasting glucose)  -     Hemoglobin A1C; Future; Expected date: " 05/02/2025    3. Arthritis  -     Discontinue: meloxicam (MOBIC) 15 MG tablet; Take 1 tablet (15 mg total) by mouth daily as needed for Pain.  Dispense: 90 tablet; Refill: 1  -     meloxicam (MOBIC) 15 MG tablet; Take 1 tablet (15 mg total) by mouth daily as needed for Pain.  Dispense: 90 tablet; Refill: 1    4. Gastroesophageal reflux disease, unspecified whether esophagitis present  -     Discontinue: omeprazole (PRILOSEC) 20 MG capsule; Take 1 capsule (20 mg total) by mouth once daily.  Dispense: 90 capsule; Refill: 1  -     omeprazole (PRILOSEC) 20 MG capsule; Take 1 capsule (20 mg total) by mouth once daily.  Dispense: 90 capsule; Refill: 1    5. BMI 40.0-44.9, adult         Patient Instructions   Akhil Wynn,     If you are due for any health screening(s) below please notify me so we can arrange them to be ordered and scheduled. Most healthy patients at your age complete them, but you are free to accept or refuse.     If you can't do it, I'll definitely understand. If you can, I'd certainly appreciate it!    Tests to Keep You Healthy    Colon Cancer Screening: Met on 11/5/2015  Last Blood Pressure <= 139/89 (5/2/2025): NO      Lets manage your high blood pressure     Your blood pressure was above 140/90 today during your visit. We recommend that you schedule a nurse visit in two weeks to check your blood pressure and discuss ways to support your health goals.     You can also manage your health and record your blood pressure from the comfort of home by keeping a daily blood pressure log. These results are shared with and reviewed by your provider. Please print this form (Daily Blood Pressure Log) to assist you in keeping track of your blood pressure at home.     Schedule your nurse visit in two weeks to learn more about how to track and manage high blood pressure.    Daily Blood Pressure Log    Name:__________________________________                  Date of Birth:_________    Average Blood Pressure:   __________      Date: Time  (a.m.) Blood  Pressure: Pulse  Rate: Time  (p.m.) Blood  Pressure : Pulse  Rate:   Sample 8:37 127/83 84                                                                                                                                                                                                 Health Maintenance Due   Topic Date Due    Shingles Vaccine (1 of 2) Never done         Health Maintenance Topics with due status: Not Due       Topic Last Completion Date    Colorectal Cancer Screening 11/05/2015    TETANUS VACCINE 06/21/2023    PROSTATE-SPECIFIC ANTIGEN 09/23/2024    Diabetes Urine Screening 09/23/2024    Hemoglobin A1c 09/23/2024    Lipid Panel 01/27/2025       Future Appointments   Date Time Provider Department Center   11/17/2025  8:00 AM Stephani Farah ACNP [facility]            Signature:  [facility] [address] [place] [address]  [phone]    Date of encounter: 5/2/25

## 2025-05-02 NOTE — ASSESSMENT & PLAN NOTE
BP Readings from Last 3 Encounters:   05/02/25 (!) 169/94   01/27/25 (!) 158/95   09/23/24 (!) 198/125    No meds for several days  Encouraged pt to call for refills and not run out of medications  Encouraged home monitoring  Pt to follow up in 2 week bp check  Goal less 130/80

## 2025-05-02 NOTE — PATIENT INSTRUCTIONS
Akhil Wynn,     If you are due for any health screening(s) below please notify me so we can arrange them to be ordered and scheduled. Most healthy patients at your age complete them, but you are free to accept or refuse.     If you can't do it, I'll definitely understand. If you can, I'd certainly appreciate it!    Tests to Keep You Healthy    Colon Cancer Screening: Met on 11/5/2015  Last Blood Pressure <= 139/89 (5/2/2025): NO      Lets manage your high blood pressure     Your blood pressure was above 140/90 today during your visit. We recommend that you schedule a nurse visit in two weeks to check your blood pressure and discuss ways to support your health goals.     You can also manage your health and record your blood pressure from the comfort of home by keeping a daily blood pressure log. These results are shared with and reviewed by your provider. Please print this form (Daily Blood Pressure Log) to assist you in keeping track of your blood pressure at home.     Schedule your nurse visit in two weeks to learn more about how to track and manage high blood pressure.    Daily Blood Pressure Log    Name:__________________________________                  Date of Birth:_________    Average Blood Pressure:  __________      Date: Time  (a.m.) Blood  Pressure: Pulse  Rate: Time  (p.m.) Blood  Pressure : Pulse  Rate:   Sample 8:37 127/83 84

## 2025-05-05 ENCOUNTER — RESULTS FOLLOW-UP (OUTPATIENT)
Dept: FAMILY MEDICINE | Facility: CLINIC | Age: 60
End: 2025-05-05
Payer: COMMERCIAL

## 2025-08-11 DIAGNOSIS — I10 PRIMARY HYPERTENSION: ICD-10-CM

## 2025-08-11 RX ORDER — OLMESARTAN MEDOXOMIL, AMLODIPINE AND HYDROCHLOROTHIAZIDE TABLET 40/10/25 MG 40; 10; 25 MG/1; MG/1; MG/1
1 TABLET ORAL DAILY
Qty: 90 TABLET | Refills: 1 | Status: SHIPPED | OUTPATIENT
Start: 2025-08-11